# Patient Record
Sex: MALE | Race: WHITE | URBAN - METROPOLITAN AREA
[De-identification: names, ages, dates, MRNs, and addresses within clinical notes are randomized per-mention and may not be internally consistent; named-entity substitution may affect disease eponyms.]

---

## 2023-05-11 ENCOUNTER — TELEPHONE (OUTPATIENT)
Dept: CARDIOLOGY CLINIC | Age: 73
End: 2023-05-11

## 2023-06-12 PROBLEM — R42 DIZZINESS: Status: ACTIVE | Noted: 2023-06-12

## 2023-06-12 PROBLEM — R06.02 SOB (SHORTNESS OF BREATH): Status: ACTIVE | Noted: 2023-06-12

## 2023-06-12 PROBLEM — R53.83 FATIGUE: Status: ACTIVE | Noted: 2023-06-12

## 2023-06-26 ENCOUNTER — TELEPHONE (OUTPATIENT)
Dept: INTERVENTIONAL RADIOLOGY/VASCULAR | Age: 73
End: 2023-06-26

## 2023-06-30 ENCOUNTER — PROCEDURE VISIT (OUTPATIENT)
Dept: CARDIOLOGY CLINIC | Age: 73
End: 2023-06-30

## 2023-06-30 DIAGNOSIS — R42 LIGHTHEADEDNESS: ICD-10-CM

## 2023-06-30 DIAGNOSIS — R42 DIZZINESS: ICD-10-CM

## 2023-07-03 ENCOUNTER — HOSPITAL ENCOUNTER (OUTPATIENT)
Dept: CT IMAGING | Age: 73
Discharge: HOME | End: 2023-07-03
Payer: MEDICARE

## 2023-07-03 ENCOUNTER — TELEPHONE (OUTPATIENT)
Dept: CARDIOLOGY CLINIC | Age: 73
End: 2023-07-03

## 2023-07-03 VITALS
SYSTOLIC BLOOD PRESSURE: 125 MMHG | BODY MASS INDEX: 20.44 KG/M2 | TEMPERATURE: 98 F | RESPIRATION RATE: 18 BRPM | HEIGHT: 69 IN | DIASTOLIC BLOOD PRESSURE: 62 MMHG | HEART RATE: 55 BPM | OXYGEN SATURATION: 99 % | WEIGHT: 138 LBS

## 2023-07-03 DIAGNOSIS — R06.09 DOE (DYSPNEA ON EXERTION): ICD-10-CM

## 2023-07-03 DIAGNOSIS — I73.9 CLAUDICATION (HCC): ICD-10-CM

## 2023-07-03 DIAGNOSIS — I70.212 ATHEROSCLEROSIS OF NATIVE ARTERIES OF EXTREMITIES WITH INTERMITTENT CLAUDICATION, LEFT LEG (HCC): ICD-10-CM

## 2023-07-03 DIAGNOSIS — R53.83 OTHER FATIGUE: ICD-10-CM

## 2023-07-03 DIAGNOSIS — R93.1 ABNORMAL COMPUTED TOMOGRAPHY ANGIOGRAPHY OF HEART: ICD-10-CM

## 2023-07-03 DIAGNOSIS — R93.5 ABNORMAL COMPUTED TOMOGRAPHY ANGIOGRAPHY (CTA) OF ABDOMEN: Primary | ICD-10-CM

## 2023-07-03 DIAGNOSIS — R06.02 SOB (SHORTNESS OF BREATH): ICD-10-CM

## 2023-07-03 DIAGNOSIS — F17.200 SMOKER: ICD-10-CM

## 2023-07-03 DIAGNOSIS — R94.30 ABNORMAL RESULT OF CARDIOVASCULAR FUNCTION STUDY, UNSPECIFIED: ICD-10-CM

## 2023-07-03 DIAGNOSIS — R42 DIZZINESS: ICD-10-CM

## 2023-07-03 LAB
CREAT SERPL-MCNC: 0.6 MG/DL (ref 0.8–1.3)
GFR SERPLBLD CREATININE-BSD FMLA CKD-EPI: >60 ML/MIN/{1.73_M2}

## 2023-07-03 PROCEDURE — 6360000004 HC RX CONTRAST MEDICATION: Performed by: INTERNAL MEDICINE

## 2023-07-03 PROCEDURE — 75635 CT ANGIO ABDOMINAL ARTERIES: CPT

## 2023-07-03 PROCEDURE — 82565 ASSAY OF CREATININE: CPT

## 2023-07-03 PROCEDURE — 36415 COLL VENOUS BLD VENIPUNCTURE: CPT

## 2023-07-03 PROCEDURE — 75574 CT ANGIO HRT W/3D IMAGE: CPT

## 2023-07-03 PROCEDURE — 6370000000 HC RX 637 (ALT 250 FOR IP): Performed by: STUDENT IN AN ORGANIZED HEALTH CARE EDUCATION/TRAINING PROGRAM

## 2023-07-03 RX ORDER — NITROGLYCERIN 0.4 MG/1
0.4 TABLET SUBLINGUAL ONCE
Status: COMPLETED | OUTPATIENT
Start: 2023-07-03 | End: 2023-07-03

## 2023-07-03 RX ADMIN — IOHEXOL 85 ML: 350 INJECTION, SOLUTION INTRAVENOUS at 08:46

## 2023-07-03 RX ADMIN — IOHEXOL 120 ML: 350 INJECTION, SOLUTION INTRAVENOUS at 08:58

## 2023-07-03 RX ADMIN — NITROGLYCERIN 0.4 MG: 0.4 TABLET, ORALLY DISINTEGRATING SUBLINGUAL at 08:54

## 2023-07-03 ASSESSMENT — PAIN - FUNCTIONAL ASSESSMENT: PAIN_FUNCTIONAL_ASSESSMENT: 0-10

## 2023-07-03 NOTE — PROGRESS NOTES
Cor cta complete, pt tolerated well  Pt given verbal and written discharge instructions, pt verbalizes understanding

## 2023-07-05 ENCOUNTER — TELEPHONE (OUTPATIENT)
Dept: CARDIOLOGY CLINIC | Age: 73
End: 2023-07-05

## 2023-07-05 PROBLEM — R94.30 ABNORMAL RESULT OF CARDIOVASCULAR FUNCTION STUDY, UNSPECIFIED: Status: ACTIVE | Noted: 2023-07-05

## 2023-07-05 PROBLEM — R06.09 DOE (DYSPNEA ON EXERTION): Status: ACTIVE | Noted: 2023-06-12

## 2023-07-05 PROBLEM — I73.9 CLAUDICATION (HCC): Status: ACTIVE | Noted: 2023-07-05

## 2023-07-05 PROBLEM — I70.212 ATHEROSCLEROSIS OF NATIVE ARTERIES OF EXTREMITIES WITH INTERMITTENT CLAUDICATION, LEFT LEG (HCC): Status: ACTIVE | Noted: 2023-07-05

## 2023-07-05 PROBLEM — F17.200 SMOKER: Status: ACTIVE | Noted: 2023-07-05

## 2023-07-05 NOTE — TELEPHONE ENCOUNTER
Patient called back again trying to reach Angela/. Andrew Canas spoke to patient and offered him an appt with [address] per his request and patient now declined. He wants to go ahead and schedule LHC. Attempted to direct transfer to [facility], unable to reach. M for her to return his call. Pt advised to keep phone by him so he does not miss the call again. Pt verbalized understanding and was agreeable to plan.

## 2023-07-05 NOTE — TELEPHONE ENCOUNTER
----- Message from Flory Hooker MD sent at 7/2/2023 10:23 PM EDT -----  Just mild athero  Continue aspirin and statin if no contraindication  He'll need a repeat scan in 1 year

## 2023-07-05 NOTE — TELEPHONE ENCOUNTER
----- Message from Papi Tompkins MD sent at 7/2/2023 10:23 PM EDT -----  Just mild athero  Continue aspirin and statin if no contraindication  He'll need a repeat scan in 1 year

## 2023-07-05 NOTE — TELEPHONE ENCOUNTER
Spoke with patient and got him scheduled  for procedure. We went over basic instructions and he verbalized understanding.        Procedure - [facility]   Date: 7/12/2023  Arrival time: 12:00pm   Procedure time: 1:00 pm       Polly updated / Added in [id number] [facility] 15 / emailed cath lab

## 2023-07-05 NOTE — TELEPHONE ENCOUNTER
Patient called back and I attempted to discuss results of Carotids. Pt adamant about discussing the [facility] with WALLACE. Advised patient that [address] is out until the week of the 17th. Pt already has an appt that day so he can ask questions then. Pt then stated that he would like to speak to [facility]. He \"will not allow anyone to do anything until his questions are answered\". Pt agreed to return Angela's call to get it scheduled in the meantime. He will keep his appt with WALLACE on the 17th. He would like to speak or see in person [facility] before he will proceed. He does not want to speak to a nurse, only the doctor performing the procedure.

## 2023-07-12 ENCOUNTER — HOSPITAL ENCOUNTER (OUTPATIENT)
Dept: CARDIAC CATH/INVASIVE PROCEDURES | Age: 73
Discharge: HOME | End: 2023-07-12
Attending: INTERNAL MEDICINE | Admitting: INTERNAL MEDICINE
Payer: MEDICARE

## 2023-07-12 ENCOUNTER — TELEPHONE (OUTPATIENT)
Dept: CARDIOLOGY CLINIC | Age: 73
End: 2023-07-12

## 2023-07-12 VITALS
HEART RATE: 74 BPM | HEIGHT: 69 IN | BODY MASS INDEX: 19.99 KG/M2 | WEIGHT: 135 LBS | SYSTOLIC BLOOD PRESSURE: 149 MMHG | DIASTOLIC BLOOD PRESSURE: 71 MMHG

## 2023-07-12 DIAGNOSIS — R93.1 ABNORMAL COMPUTED TOMOGRAPHY ANGIOGRAPHY OF HEART: ICD-10-CM

## 2023-07-12 LAB
ANION GAP SERPL CALCULATED.3IONS-SCNC: 12 MMOL/L (ref 3–16)
BUN SERPL-MCNC: 9 MG/DL (ref 7–20)
CALCIUM SERPL-MCNC: 9.5 MG/DL (ref 8.3–10.6)
CHLORIDE SERPL-SCNC: 103 MMOL/L (ref 99–110)
CO2 SERPL-SCNC: 23 MMOL/L (ref 21–32)
CREAT SERPL-MCNC: <0.5 MG/DL (ref 0.8–1.3)
DEPRECATED RDW RBC AUTO: 19.6 % (ref 12.4–15.4)
EKG ATRIAL RATE: 74 BPM
EKG DIAGNOSIS: NORMAL
EKG P AXIS: 62 DEGREES
EKG P-R INTERVAL: 150 MS
EKG Q-T INTERVAL: 406 MS
EKG QRS DURATION: 102 MS
EKG QTC CALCULATION (BAZETT): 450 MS
EKG R AXIS: -10 DEGREES
EKG T AXIS: 3 DEGREES
EKG VENTRICULAR RATE: 74 BPM
GFR SERPLBLD CREATININE-BSD FMLA CKD-EPI: >60 ML/MIN/{1.73_M2}
GLUCOSE SERPL-MCNC: 109 MG/DL (ref 70–99)
HCT VFR BLD AUTO: 39.1 % (ref 40.5–52.5)
HGB BLD-MCNC: 13 G/DL (ref 13.5–17.5)
LEFT VENTRICULAR EJECTION FRACTION MODE: NORMAL
LV EF: 50 %
LV EF: 50 %
LVEF MODALITY: NORMAL
MCH RBC QN AUTO: 29.6 PG (ref 26–34)
MCHC RBC AUTO-ENTMCNC: 33.3 G/DL (ref 31–36)
MCV RBC AUTO: 89.1 FL (ref 80–100)
PLATELET # BLD AUTO: 233 K/UL (ref 135–450)
PMV BLD AUTO: 8.1 FL (ref 5–10.5)
POTASSIUM SERPL-SCNC: 4 MMOL/L (ref 3.5–5.1)
RBC # BLD AUTO: 4.39 M/UL (ref 4.2–5.9)
SODIUM SERPL-SCNC: 138 MMOL/L (ref 136–145)
WBC # BLD AUTO: 3.6 K/UL (ref 4–11)

## 2023-07-12 PROCEDURE — C1769 GUIDE WIRE: HCPCS

## 2023-07-12 PROCEDURE — 2709999900 HC NON-CHARGEABLE SUPPLY

## 2023-07-12 PROCEDURE — 6360000002 HC RX W HCPCS

## 2023-07-12 PROCEDURE — C1894 INTRO/SHEATH, NON-LASER: HCPCS

## 2023-07-12 PROCEDURE — 36415 COLL VENOUS BLD VENIPUNCTURE: CPT

## 2023-07-12 PROCEDURE — 6360000004 HC RX CONTRAST MEDICATION: Performed by: INTERNAL MEDICINE

## 2023-07-12 PROCEDURE — 93458 L HRT ARTERY/VENTRICLE ANGIO: CPT

## 2023-07-12 PROCEDURE — 99153 MOD SED SAME PHYS/QHP EA: CPT

## 2023-07-12 PROCEDURE — 93005 ELECTROCARDIOGRAM TRACING: CPT | Performed by: INTERNAL MEDICINE

## 2023-07-12 PROCEDURE — 99152 MOD SED SAME PHYS/QHP 5/>YRS: CPT

## 2023-07-12 PROCEDURE — 85027 COMPLETE CBC AUTOMATED: CPT

## 2023-07-12 PROCEDURE — 93010 ELECTROCARDIOGRAM REPORT: CPT | Performed by: INTERNAL MEDICINE

## 2023-07-12 PROCEDURE — 2500000003 HC RX 250 WO HCPCS

## 2023-07-12 PROCEDURE — 80048 BASIC METABOLIC PNL TOTAL CA: CPT

## 2023-07-12 RX ORDER — SODIUM CHLORIDE 0.9 % (FLUSH) 0.9 %
5-40 SYRINGE (ML) INJECTION PRN
Status: DISCONTINUED | OUTPATIENT
Start: 2023-07-12 | End: 2023-07-13 | Stop reason: HOSPADM

## 2023-07-12 RX ADMIN — IOPAMIDOL 55 ML: 755 INJECTION, SOLUTION INTRAVENOUS at 14:36

## 2023-07-12 NOTE — PROCEDURES
Patient:  Kim Viera   :   1950    Procedural Summary  ~Consent:   Obtained written and verbal consent      Risks/benefits explained in detail  ~Procedure:    Left Heart Catheterization  ~Medications:    Procedural sedation with minimal conscious sedation  ~Complications:   None  ~Blood Loss:    <10cc  ~Specimens:    None obtained  ~Pre-sedation re-evaluation: Performed immediately prior to procedure. Medication and Procedural Reconciliation:  An independent trained observer pushed medications at my direction. We monitored the patient's level of consciousness and vital signs/physiologic status throughout the procedure duration (see start and stop times below). Sedation: 4 mg Versed, 150 mcg Fentanyl  Sedation start: 1245  Sedation stop: 1314    Cardiac Cath LVG:  Anatomy:   LM-nml   LAD-prox 50%, mid 60-70% heavily calcified  Cx-nml  OM- mid 99%  RCA-dom mid 100%   RPDA- L to R collaterals to LAD  LVEF- 50%  LVG- inferobasal akinesis  LVEDP- 2    Contrast: 55  Flouro Time: 2.2  Access: R radial a    Impression  ~Coronary Angiography w/ severe MVD  ~LVG with LVEF of 50% and inferior regional wall motion abnormalities. Recommendation  ~Aggressive medical treatment and risk factor modification  ~1. Medications reviewed, no changes at this time. 2. Post cath IVF. Bedrest.  3. Patient with moderate to severe multi vessel disease. 4. Patient has been advised on the importance of regular exercise of at least 20-30 minutes daily alternating with aerobic and isometric activities. 5. Patient counseled about and offered assistance for smoking cessation   6. No indication for cardiac rehab  7. Follow up with Dr. Mónica Aguirre as outpatient to discuss need for CABG. He is asymptomatic and active. LAD disease is not critical. I believe that he would gain little benefit from revascularization at this time. If referring doc recommends CABG, patient will follow up with Dr. Glenis Gomes later next week.          Karime Morgan
PEDS

## 2023-07-12 NOTE — H&P
[address]  [phone]  6/14/23  Referring: Dr. Gee Beltran CONSULT/CHIEF COMPLAINT/HPI     Reason for visit/ Chief complaint  Dyspnea on Exertion  Dizziness Claudication   HPI Yandy Price is a 75 y.o. male patient who was seen for the first time by me 6/12/23 after a referral from Dr Yvette Lackey for Shortness of Breath, Fatigue, Dizzienss, and Lightheadedness. At that time, he c/o Dizziness, Shortness of Breath and Fatigue. He had COVID at the end of last year. Denies CP or tightness. He smoked since he was 13 yo, 3/4 ppd and is not interested in quitting. He has a hx of drinking a lot of alcohol but does not really drink anymore. He is a retired . The dizziness is positional. He thinks it is a lack of food intake; he has not had an appetite since COVID. Today, Virlinda Halsted was walking the treadmill during a stress test when he suddenly jumped off after 3 minutes due to severe cramping in both calves. He said his legs hurt when he walks a lot or vigorously. At home, his legs feels better after a few minutes of rest, but he has to stop due to pain on a very frequent basis. He has not noticed any color changes or cold feelings in his leg. He has occasionally had decreased sensation on the base of his left foot. He still has intermittent symptoms of chest discomfort and SOB. He is still smoking. He notes today that he has lost a fair amount of weight unintentionally over the last few months. Patient is adherent with medications and is tolerating them well without side effects     HISTORY/ALLERGIES/ROS     MedHx: Tobacco abuse  SurgHx:  has no past surgical history on file. SocHx:  reports that he has been smoking cigarettes. He has never used smokeless tobacco. He reports current alcohol use. He reports current drug use. Drug: Marijuana Richard Sida).    FamHx:   Family History   Problem Relation Age of Onset    Cancer Father      Allergies: Patient

## 2023-07-17 ENCOUNTER — OFFICE VISIT (OUTPATIENT)
Dept: CARDIOLOGY CLINIC | Age: 73
End: 2023-07-17
Payer: MEDICARE

## 2023-07-17 VITALS
WEIGHT: 136 LBS | HEIGHT: 69 IN | SYSTOLIC BLOOD PRESSURE: 126 MMHG | BODY MASS INDEX: 20.14 KG/M2 | DIASTOLIC BLOOD PRESSURE: 64 MMHG | OXYGEN SATURATION: 97 % | HEART RATE: 75 BPM

## 2023-07-17 DIAGNOSIS — R06.02 SOB (SHORTNESS OF BREATH): ICD-10-CM

## 2023-07-17 DIAGNOSIS — F17.200 SMOKER: ICD-10-CM

## 2023-07-17 DIAGNOSIS — R53.83 OTHER FATIGUE: ICD-10-CM

## 2023-07-17 DIAGNOSIS — R42 DIZZINESS: ICD-10-CM

## 2023-07-17 DIAGNOSIS — R06.09 DOE (DYSPNEA ON EXERTION): ICD-10-CM

## 2023-07-17 DIAGNOSIS — I73.9 CLAUDICATION (HCC): Primary | ICD-10-CM

## 2023-07-17 DIAGNOSIS — I20.8 ANGINAL EQUIVALENT (HCC): ICD-10-CM

## 2023-07-17 DIAGNOSIS — I25.10 CAD IN NATIVE ARTERY: ICD-10-CM

## 2023-07-17 DIAGNOSIS — I70.212 ATHEROSCLEROSIS OF NATIVE ARTERIES OF EXTREMITIES WITH INTERMITTENT CLAUDICATION, LEFT LEG (HCC): ICD-10-CM

## 2023-07-17 PROCEDURE — G8420 CALC BMI NORM PARAMETERS: HCPCS | Performed by: INTERNAL MEDICINE

## 2023-07-17 PROCEDURE — 3017F COLORECTAL CA SCREEN DOC REV: CPT | Performed by: INTERNAL MEDICINE

## 2023-07-17 PROCEDURE — 1123F ACP DISCUSS/DSCN MKR DOCD: CPT | Performed by: INTERNAL MEDICINE

## 2023-07-17 PROCEDURE — 99215 OFFICE O/P EST HI 40 MIN: CPT | Performed by: INTERNAL MEDICINE

## 2023-07-17 PROCEDURE — 4004F PT TOBACCO SCREEN RCVD TLK: CPT | Performed by: INTERNAL MEDICINE

## 2023-07-17 PROCEDURE — G8427 DOCREV CUR MEDS BY ELIG CLIN: HCPCS | Performed by: INTERNAL MEDICINE

## 2023-07-17 RX ORDER — MIRTAZAPINE 7.5 MG/1
7.5 TABLET, FILM COATED ORAL NIGHTLY
COMMUNITY
Start: 2023-07-07

## 2023-07-17 RX ORDER — METOPROLOL SUCCINATE 25 MG/1
25 TABLET, EXTENDED RELEASE ORAL DAILY
Qty: 90 TABLET | Refills: 3 | Status: SHIPPED | OUTPATIENT
Start: 2023-07-17

## 2023-07-17 NOTE — PATIENT INSTRUCTIONS
Follow up with Dr Yudi Adan in 1 month     Start Metoprolol XL 25mg daily. Okay to continue Mirtazapine, there is no cardiac contraindication. Light lifting no more than 10 lbs for a couple weeks with the R arm      Continue with your appointment with Dr Pete Sheikh off on eye surgery until you see Dr Sandra Finch. It is very important that you quit smoking completely     Call for any questions or concerns.

## 2023-07-18 ENCOUNTER — TELEPHONE (OUTPATIENT)
Dept: CARDIOLOGY CLINIC | Age: 73
End: 2023-07-18

## 2023-07-18 NOTE — TELEPHONE ENCOUNTER
Attempted to call patient per [facility] message. NA/LVM to return our call.

## 2023-07-18 NOTE — TELEPHONE ENCOUNTER
Pt returned call. Discussed his question about metoprolol. Advised per WALLACE. Pt verbalized understanding and has no further questions at this time.

## 2023-07-20 ENCOUNTER — OFFICE VISIT (OUTPATIENT)
Dept: CARDIOTHORACIC SURGERY | Age: 73
End: 2023-07-20
Payer: MEDICARE

## 2023-07-20 ENCOUNTER — TELEPHONE (OUTPATIENT)
Dept: CARDIOLOGY CLINIC | Age: 73
End: 2023-07-20

## 2023-07-20 VITALS
WEIGHT: 138 LBS | DIASTOLIC BLOOD PRESSURE: 72 MMHG | OXYGEN SATURATION: 97 % | BODY MASS INDEX: 20.44 KG/M2 | HEART RATE: 73 BPM | HEIGHT: 69 IN | TEMPERATURE: 98.4 F | SYSTOLIC BLOOD PRESSURE: 138 MMHG

## 2023-07-20 DIAGNOSIS — R06.09 DOE (DYSPNEA ON EXERTION): ICD-10-CM

## 2023-07-20 DIAGNOSIS — R53.83 FATIGUE, UNSPECIFIED TYPE: ICD-10-CM

## 2023-07-20 DIAGNOSIS — I10 ESSENTIAL HYPERTENSION: ICD-10-CM

## 2023-07-20 DIAGNOSIS — I25.10 CORONARY ARTERY DISEASE INVOLVING NATIVE CORONARY ARTERY OF NATIVE HEART WITHOUT ANGINA PECTORIS: Primary | ICD-10-CM

## 2023-07-20 DIAGNOSIS — F17.200 SMOKER: ICD-10-CM

## 2023-07-20 PROCEDURE — 3075F SYST BP GE 130 - 139MM HG: CPT | Performed by: THORACIC SURGERY (CARDIOTHORACIC VASCULAR SURGERY)

## 2023-07-20 PROCEDURE — G8420 CALC BMI NORM PARAMETERS: HCPCS | Performed by: THORACIC SURGERY (CARDIOTHORACIC VASCULAR SURGERY)

## 2023-07-20 PROCEDURE — 4004F PT TOBACCO SCREEN RCVD TLK: CPT | Performed by: THORACIC SURGERY (CARDIOTHORACIC VASCULAR SURGERY)

## 2023-07-20 PROCEDURE — G8427 DOCREV CUR MEDS BY ELIG CLIN: HCPCS | Performed by: THORACIC SURGERY (CARDIOTHORACIC VASCULAR SURGERY)

## 2023-07-20 PROCEDURE — 3017F COLORECTAL CA SCREEN DOC REV: CPT | Performed by: THORACIC SURGERY (CARDIOTHORACIC VASCULAR SURGERY)

## 2023-07-20 PROCEDURE — 3078F DIAST BP <80 MM HG: CPT | Performed by: THORACIC SURGERY (CARDIOTHORACIC VASCULAR SURGERY)

## 2023-07-20 PROCEDURE — 1123F ACP DISCUSS/DSCN MKR DOCD: CPT | Performed by: THORACIC SURGERY (CARDIOTHORACIC VASCULAR SURGERY)

## 2023-07-20 PROCEDURE — 99205 OFFICE O/P NEW HI 60 MIN: CPT | Performed by: THORACIC SURGERY (CARDIOTHORACIC VASCULAR SURGERY)

## 2023-07-20 NOTE — PROGRESS NOTES
Review of Systems:  Constitutional:  No night sweats, headaches. + weight loss r/t COVID (improving). Eyes:  + glaucoma, cataracts. ENMT:  No nosebleeds, deviated septum. Cardiac:  No arrhythmias. Vascular:  No claudication, varicosities. GI:  No PUD, heartburn. :  No kidney stones, frequent UTIs  Musculoskeletal:  + arthritis L shoulder. No gout. Respiratory:  No SOB, emphysema, asthma. Integumentary:  No dermatitis, itching, rash. Neurological:  No stroke, TIAs, seizures. + dizziness  Psychiatric:  No depression, anxiety. Endocrine: No diabetes, thyroid issues. Hematologic:  No bleeding, easy bruising. Immunologic:  No known cancer, steroid therapies.
has been recommended and discussed with the patient. I have discussed the risks, benefits and alternatives with patient, including risks of infection, bleeding, MI, stroke, arrhythmias and an operative mortality risk of 1-2% as outlined in the STS Cardiac Surgery Risk profile above. I also discussed the the alternative of not doing surgery and the consequences of that action. Questions have been answered and the patient is willing to proceed. I also had a discussion regarding secondary risk modification with him, addressing the need for immediate tobacco cessation, weight loss through dietary modification and exercise, and will continue to have those conversations with him post op. Surgery to be scheduled for a future date pending his cataract surgery. He wants to get his cataract surgery done before his CABG surgery which is reasonable so that his Plavix medication does not need to be interrupted. Carotid duplex scan. Vein mapping. PFTs.     Gerri Simons MD, MD  FACS,  [facility]  7/20/2023  1:05 PM

## 2023-07-21 ENCOUNTER — HOSPITAL ENCOUNTER (OUTPATIENT)
Dept: CT IMAGING | Age: 73
Discharge: HOME | End: 2023-07-21
Attending: INTERNAL MEDICINE
Payer: MEDICARE

## 2023-07-21 DIAGNOSIS — R93.5 ABNORMAL COMPUTED TOMOGRAPHY ANGIOGRAPHY (CTA) OF ABDOMEN: ICD-10-CM

## 2023-07-21 PROCEDURE — 6360000004 HC RX CONTRAST MEDICATION: Performed by: INTERNAL MEDICINE

## 2023-07-21 PROCEDURE — 74170 CT ABD WO CNTRST FLWD CNTRST: CPT

## 2023-07-21 RX ADMIN — IOPAMIDOL 75 ML: 755 INJECTION, SOLUTION INTRAVENOUS at 15:20

## 2023-07-21 NOTE — TELEPHONE ENCOUNTER
Returned patients call. He could not remember his questions but it was in regards to his upcoming CABG with Dr César Kelly. I advised patient to call that office when he remembers the question to get questions answered related to that surgery. Pt verbalized understanding and was agreeable to plan.

## 2023-07-23 NOTE — PROGRESS NOTES
[facility]  Consultation Note    Chief Complaint / Reason for Consultation  Claudication    History of Present Illness  Patient is a 75 y.o. male referred today by Dr. Amol Ricks for lower extremity claudication. His associate medical history is significant for coronary artery disease, tobacco abuse and hypertension. He has recently been seen by Dr. Cesario Howe with tentative plans for CABG based on recent coronary angiogram.  While patient has mild cardiac symptoms biggest complaint patient has is lower extremity claudication. He describes pain and discomfort in both calves equally with approximately 100 to 200 yards. He states his walking distance has improved now that he is walking more. He states for approximately a year and a half he was in severe depression and really did not get off the couch or do any activity. Over the last 4 months has been increasing his activity and working on his smoking cessation. He is down to 4 to 5 cigarettes/day. No pain at rest.    Review of Systems     Denies fevers, chills, chest pain, shortness of breath, nausea, vomiting, hematemesis, diarrhea, constipation, melena, hematochezia, wt changes, vision problems, blindness, hearing problems, facial droop, slurred speech, extremity weakness, extremity numbness, dysuria. Past Medical History:   has a past medical history of CAD (coronary artery disease), Glaucoma, and Hypertension. Past Surgical History:   has a past surgical history that includes Diagnostic Cardiac Cath Lab Procedure and Arm Surgery. Medications:  Current Outpatient Medications on File Prior to Visit   Medication Sig Dispense Refill    diclofenac sodium (VOLTAREN) 1 % GEL PLEASE SEE ATTACHED FOR DETAILED DIRECTIONS (Patient not taking: Reported on 7/20/2023)      mirtazapine (REMERON) 7.5 MG tablet Take 1 tablet by mouth nightly at bedtime.       metoprolol succinate (TOPROL XL) 25 MG extended release tablet Take 1 tablet by mouth

## 2023-07-24 ENCOUNTER — TELEPHONE (OUTPATIENT)
Dept: CARDIOLOGY CLINIC | Age: 73
End: 2023-07-24

## 2023-07-24 NOTE — TELEPHONE ENCOUNTER
----- Message from Trina Campos MD sent at 7/24/2023 12:23 PM EDT -----  Liver mass appears to just be  a benign cyst

## 2023-07-25 ENCOUNTER — OFFICE VISIT (OUTPATIENT)
Dept: VASCULAR SURGERY | Age: 73
End: 2023-07-25
Payer: MEDICARE

## 2023-07-25 VITALS
WEIGHT: 136 LBS | DIASTOLIC BLOOD PRESSURE: 60 MMHG | HEIGHT: 69 IN | BODY MASS INDEX: 20.14 KG/M2 | SYSTOLIC BLOOD PRESSURE: 124 MMHG

## 2023-07-25 DIAGNOSIS — I70.213 ATHEROSCLEROSIS OF NATIVE ARTERIES OF EXTREMITIES WITH INTERMITTENT CLAUDICATION, BILATERAL LEGS (HCC): Primary | ICD-10-CM

## 2023-07-25 PROCEDURE — G8420 CALC BMI NORM PARAMETERS: HCPCS | Performed by: SURGERY

## 2023-07-25 PROCEDURE — G8427 DOCREV CUR MEDS BY ELIG CLIN: HCPCS | Performed by: SURGERY

## 2023-07-25 PROCEDURE — 3017F COLORECTAL CA SCREEN DOC REV: CPT | Performed by: SURGERY

## 2023-07-25 PROCEDURE — 99203 OFFICE O/P NEW LOW 30 MIN: CPT | Performed by: SURGERY

## 2023-07-25 PROCEDURE — 4004F PT TOBACCO SCREEN RCVD TLK: CPT | Performed by: SURGERY

## 2023-07-25 PROCEDURE — 1123F ACP DISCUSS/DSCN MKR DOCD: CPT | Performed by: SURGERY

## 2023-07-28 ENCOUNTER — TELEPHONE (OUTPATIENT)
Dept: CARDIOLOGY CLINIC | Age: 73
End: 2023-07-28

## 2023-08-02 ENCOUNTER — TELEPHONE (OUTPATIENT)
Dept: CARDIOLOGY CLINIC | Age: 73
End: 2023-08-02

## 2023-08-03 ENCOUNTER — TELEPHONE (OUTPATIENT)
Dept: CARDIOLOGY CLINIC | Age: 73
End: 2023-08-03

## 2023-08-03 NOTE — TELEPHONE ENCOUNTER
CARDIAC CLEARANCE     What type of procedure are you having? Cataract sx on right eye    Which physician is performing your procedure? Dr. Shane Laughlin    When is your procedure scheduled for?  08/08/23    Where are you having this procedure? [facility]    Are you taking Blood Thinners? no   If so what? (Name/dose/frequesncy)     Does the surgeon want you to stop your blood thinner? no  If so for how long?     Phone Number and Contact Name for Physicians office:  [phone]    Fax number to send information:  [phone]

## 2023-08-04 ENCOUNTER — TELEPHONE (OUTPATIENT)
Dept: CARDIOLOGY CLINIC | Age: 73
End: 2023-08-04

## 2023-08-04 NOTE — TELEPHONE ENCOUNTER
[address] is out of the office today but message given to  that [address] is out until Monday and we will send his clearance on Monday when he returns. Called patient and advised of the above. He is doing well, no CP or SOB at this time. Cut his grass yesterday and did fine. Walking and doing chores as much as he can to stay active. He is slowly cutting back on smoking.

## 2023-08-04 NOTE — TELEPHONE ENCOUNTER
Pt called to check on his clearance   Per Pt he is requesting for the clearance to be sent today so he can get his procedure on Tues. 08/08/2023. Please advise.   Thank you

## 2023-08-07 ENCOUNTER — TELEPHONE (OUTPATIENT)
Dept: CARDIOLOGY CLINIC | Age: 73
End: 2023-08-07

## 2023-08-11 NOTE — PROGRESS NOTES
[address]  [phone]  8/11/23  Referring: Dr. Carol Palumbo CONSULT/CHIEF COMPLAINT/HPI     Reason for visit/ Chief complaint  Dyspnea on Exertion  Dizziness Claudication   HPI Ozzy Jacobson is a 75 y.o. male patient I just met last month in consultation for a variety of symptoms including shortness of breath and dizziness. He smoked since he was 11 yo, 3/4 ppd and is not interested in quitting. He has a hx of drinking a lot of alcohol but does not really drink anymore. Given that he wasn't able to complete his treadmill, given his profoundly high pretest probability of CAD I got him a coronary CTA, which was very abnormal, thus he was referred for cath. This was one last week showing a  of his mid (dominant) RCA with collaterals from LAD to PDA, a normal LM and LCx, moderate 60-70% heavily calcified LAD lesion, and a 99% mid OM1. No intervention was done at the time, given concerns that he might need a CABG. Patient reported to interventional cardiology that he had \"no symptoms,\" thus an outpt rather than inpatient referral was made to CT surgery. At a prior visit, I started him on aspirin and plavix, and encouraged him to quit smoking. I am unsure why he stopped taking the aspirin. I got a CT of his abdominal aorta with bilateral runoff, which was also abnormal, and he has an appt to see PVS next week. OV 7/17/23:  He has cut back on smoking and is currently about 1 pack per week. His thinks his SOB is getting better since quitting smoking. He denies any CP, palpitations  at this time. He does have dizziness at times but thinks it is due to his eye. Today, he    Patient is adherent with medications and is tolerating them well without side effects     HISTORY/ALLERGIES/ROS     MedHx: Tobacco abuse, CAD, PVC  SurgHx:  has a past surgical history that includes Diagnostic Cardiac Cath Lab Procedure and Arm Surgery.    SocHx:  reports that he

## 2023-08-15 PROBLEM — I20.89 ANGINAL EQUIVALENT: Status: ACTIVE | Noted: 2023-08-15

## 2023-08-15 PROBLEM — I20.8 ANGINAL EQUIVALENT (HCC): Status: ACTIVE | Noted: 2023-08-15

## 2023-08-15 PROBLEM — I25.10 CAD IN NATIVE ARTERY: Status: ACTIVE | Noted: 2023-08-15

## 2023-08-15 NOTE — PROGRESS NOTES
[address]  [phone]  8/16/23  Referring: Dr. Wynn Never CONSULT/CHIEF COMPLAINT/HPI     Reason for visit/ Chief complaint  Dyspnea on Exertion  Dizziness Claudication  CAD   HPI Mike David is a 75 y.o. male patient I just met last month in consultation for a variety of symptoms including shortness of breath and dizziness. Cath done about a month ago showed 3-v disease so he was referred for CABG. This is scheduled 9/20 with Dr. Robert Fernandez. He had his R eye surgery completed. He still needs the L eye done. When he cuts the grass his legs hurt so he cuts for a few minutes and then relax for about 10 min and it goes away. No complaints of CP, SOB, palpitations or dizziness at this time. He is feeling better since starting the metoprolol. Patient is adherent with medications and is tolerating them well without side effects     HISTORY/ALLERGIES/ROS     MedHx: Tobacco abuse, CAD, PVC  SurgHx:  has a past surgical history that includes Diagnostic Cardiac Cath Lab Procedure; Arm Surgery; and Cataract extraction. SocHx:  reports that he has been smoking cigarettes. He has never used smokeless tobacco. He reports current alcohol use. He reports current drug use. Drug: Marijuana Navjot Noemi). FamHx:   Family History   Problem Relation Age of Onset    Cancer Father      Allergies: Patient has no known allergies. MEDICATIONS      Prior to Admission medications    Medication Sig Start Date End Date Taking? Authorizing Provider   diclofenac sodium (VOLTAREN) 1 % GEL  7/7/23  Yes Historical Provider, MD   mirtazapine (REMERON) 7.5 MG tablet Take 1 tablet by mouth nightly at bedtime.  7/7/23  Yes Historical Provider, MD   metoprolol succinate (TOPROL XL) 25 MG extended release tablet Take 1 tablet by mouth daily 7/17/23  Yes Jayson Saldaña MD   clopidogrel (PLAVIX) 75 MG tablet Take 1 tablet by mouth daily 6/14/23  Yes Jayson Saldaña MD   Cholecalciferol 50 MCG

## 2023-08-16 ENCOUNTER — OFFICE VISIT (OUTPATIENT)
Dept: CARDIOLOGY CLINIC | Age: 73
End: 2023-08-16
Payer: MEDICARE

## 2023-08-16 VITALS
OXYGEN SATURATION: 99 % | BODY MASS INDEX: 20.14 KG/M2 | DIASTOLIC BLOOD PRESSURE: 58 MMHG | HEIGHT: 69 IN | WEIGHT: 136 LBS | SYSTOLIC BLOOD PRESSURE: 108 MMHG | HEART RATE: 73 BPM

## 2023-08-16 DIAGNOSIS — F17.200 SMOKER: ICD-10-CM

## 2023-08-16 DIAGNOSIS — I25.10 CAD IN NATIVE ARTERY: ICD-10-CM

## 2023-08-16 DIAGNOSIS — R42 DIZZINESS: ICD-10-CM

## 2023-08-16 DIAGNOSIS — I70.212 ATHEROSCLEROSIS OF NATIVE ARTERIES OF EXTREMITIES WITH INTERMITTENT CLAUDICATION, LEFT LEG (HCC): ICD-10-CM

## 2023-08-16 DIAGNOSIS — R06.09 DOE (DYSPNEA ON EXERTION): ICD-10-CM

## 2023-08-16 DIAGNOSIS — R53.83 OTHER FATIGUE: ICD-10-CM

## 2023-08-16 DIAGNOSIS — I20.8 ANGINAL EQUIVALENT (HCC): ICD-10-CM

## 2023-08-16 DIAGNOSIS — R06.02 SOB (SHORTNESS OF BREATH): ICD-10-CM

## 2023-08-16 DIAGNOSIS — I73.9 CLAUDICATION (HCC): Primary | ICD-10-CM

## 2023-08-16 PROCEDURE — G8420 CALC BMI NORM PARAMETERS: HCPCS | Performed by: INTERNAL MEDICINE

## 2023-08-16 PROCEDURE — 4004F PT TOBACCO SCREEN RCVD TLK: CPT | Performed by: INTERNAL MEDICINE

## 2023-08-16 PROCEDURE — 3017F COLORECTAL CA SCREEN DOC REV: CPT | Performed by: INTERNAL MEDICINE

## 2023-08-16 PROCEDURE — G8427 DOCREV CUR MEDS BY ELIG CLIN: HCPCS | Performed by: INTERNAL MEDICINE

## 2023-08-16 PROCEDURE — 99214 OFFICE O/P EST MOD 30 MIN: CPT | Performed by: INTERNAL MEDICINE

## 2023-08-16 PROCEDURE — 1123F ACP DISCUSS/DSCN MKR DOCD: CPT | Performed by: INTERNAL MEDICINE

## 2023-08-16 NOTE — PATIENT INSTRUCTIONS
Follow up with Dr Arvind Quintero in 3 months     Stop your Plavix September 15th prior to your surgery due to bleeding risks. Call for any questions or concerns.

## 2023-08-21 ENCOUNTER — TELEPHONE (OUTPATIENT)
Dept: CARDIOTHORACIC SURGERY | Age: 73
End: 2023-08-21

## 2023-09-08 ENCOUNTER — TELEPHONE (OUTPATIENT)
Age: 73
End: 2023-09-08

## 2023-09-08 NOTE — TELEPHONE ENCOUNTER
At the request of Dr. Noé Raza, pt was contacted to verify his status and his desire to wait until 9/27/23 for surgery or potentially move up sooner with a different surgeon. States he has had both cataracts removed and has recovered well. He feels well and wants to wait to have surgery with Dr. Noé Raza. \"I feel confident with him. I like him and I want to wait\". Notified Dr. Noé Raza and Ilir Cordoba,  of pt decision.

## 2023-09-12 ENCOUNTER — ANESTHESIA EVENT (OUTPATIENT)
Dept: OPERATING ROOM | Age: 73
End: 2023-09-12

## 2023-09-14 ENCOUNTER — HOSPITAL ENCOUNTER (OUTPATIENT)
Dept: GENERAL RADIOLOGY | Age: 73
Discharge: HOME | End: 2023-09-14
Payer: MEDICARE

## 2023-09-14 ENCOUNTER — HOSPITAL ENCOUNTER (OUTPATIENT)
Dept: PREADMISSION TESTING | Age: 73
End: 2023-09-14
Payer: MEDICARE

## 2023-09-14 ENCOUNTER — HOSPITAL ENCOUNTER (OUTPATIENT)
Dept: VASCULAR LAB | Age: 73
End: 2023-09-14
Payer: MEDICARE

## 2023-09-14 ENCOUNTER — HOSPITAL ENCOUNTER (OUTPATIENT)
Dept: VASCULAR LAB | Age: 73
Discharge: HOME | End: 2023-09-14
Payer: MEDICARE

## 2023-09-14 VITALS
HEIGHT: 69 IN | RESPIRATION RATE: 16 BRPM | TEMPERATURE: 97.9 F | SYSTOLIC BLOOD PRESSURE: 126 MMHG | WEIGHT: 140 LBS | BODY MASS INDEX: 20.73 KG/M2 | OXYGEN SATURATION: 100 % | HEART RATE: 60 BPM | DIASTOLIC BLOOD PRESSURE: 63 MMHG

## 2023-09-14 DIAGNOSIS — I25.10 CORONARY ARTERY DISEASE, UNSPECIFIED VESSEL OR LESION TYPE, UNSPECIFIED WHETHER ANGINA PRESENT, UNSPECIFIED WHETHER NATIVE OR TRANSPLANTED HEART: ICD-10-CM

## 2023-09-14 DIAGNOSIS — I25.119 CORONARY ARTERY DISEASE WITH ANGINA PECTORIS, UNSPECIFIED VESSEL OR LESION TYPE, UNSPECIFIED WHETHER NATIVE OR TRANSPLANTED HEART (HCC): ICD-10-CM

## 2023-09-14 LAB
ABO + RH BLD: NORMAL
ALBUMIN SERPL-MCNC: 4.7 G/DL (ref 3.4–5)
ANION GAP SERPL CALCULATED.3IONS-SCNC: 10 MMOL/L (ref 3–16)
APTT BLD: 28.9 SEC (ref 22.7–35.9)
BACTERIA URNS QL MICRO: ABNORMAL /HPF
BASE EXCESS BLDA CALC-SCNC: -1.2 MMOL/L (ref -3–3)
BASOPHILS # BLD: 0 K/UL (ref 0–0.2)
BASOPHILS NFR BLD: 1.1 %
BILIRUB UR QL STRIP.AUTO: ABNORMAL
BLD GP AB SCN SERPL QL: NORMAL
BUN SERPL-MCNC: 8 MG/DL (ref 7–20)
CALCIUM OXALATE CRYSTALS: PRESENT
CALCIUM SERPL-MCNC: 9.1 MG/DL (ref 8.3–10.6)
CHLORIDE SERPL-SCNC: 101 MMOL/L (ref 99–110)
CLARITY UR: CLEAR
CO2 BLDA-SCNC: 55.2 MMOL/L
CO2 SERPL-SCNC: 27 MMOL/L (ref 21–32)
COHGB MFR BLDA: 3.3 % (ref 0–1.5)
COLOR UR: ABNORMAL
CREAT SERPL-MCNC: 0.6 MG/DL (ref 0.8–1.3)
DEPRECATED RDW RBC AUTO: 18.1 % (ref 12.4–15.4)
EOSINOPHIL # BLD: 0.1 K/UL (ref 0–0.6)
EOSINOPHIL NFR BLD: 1.4 %
EPI CELLS #/AREA URNS AUTO: 1 /HPF (ref 0–5)
GFR SERPLBLD CREATININE-BSD FMLA CKD-EPI: >60 ML/MIN/{1.73_M2}
GLUCOSE SERPL-MCNC: 112 MG/DL (ref 70–99)
GLUCOSE UR STRIP.AUTO-MCNC: NEGATIVE MG/DL
HCO3 BLDA-SCNC: 23.5 MMOL/L (ref 21–29)
HCT VFR BLD AUTO: 38.1 % (ref 40.5–52.5)
HGB BLD-MCNC: 12.6 G/DL (ref 13.5–17.5)
HGB BLDA-MCNC: 12.2 G/DL (ref 13.5–17.5)
HGB UR QL STRIP.AUTO: NEGATIVE
HYALINE CASTS #/AREA URNS AUTO: 5 /LPF (ref 0–8)
INR PPP: 1.06 (ref 0.84–1.16)
KETONES UR STRIP.AUTO-MCNC: NEGATIVE MG/DL
LEUKOCYTE ESTERASE UR QL STRIP.AUTO: ABNORMAL
LYMPHOCYTES # BLD: 1.2 K/UL (ref 1–5.1)
LYMPHOCYTES NFR BLD: 30.9 %
MCH RBC QN AUTO: 29.2 PG (ref 26–34)
MCHC RBC AUTO-ENTMCNC: 33.1 G/DL (ref 31–36)
MCV RBC AUTO: 88 FL (ref 80–100)
METHGB MFR BLDA: 0.2 %
MONOCYTES # BLD: 0.3 K/UL (ref 0–1.3)
MONOCYTES NFR BLD: 8.8 %
NEUTROPHILS # BLD: 2.2 K/UL (ref 1.7–7.7)
NEUTROPHILS NFR BLD: 57.8 %
NITRITE UR QL STRIP.AUTO: NEGATIVE
O2 THERAPY: ABNORMAL
PCO2 BLDA: 38.5 MMHG (ref 35–45)
PH BLDA: 7.39 [PH] (ref 7.35–7.45)
PH UR STRIP.AUTO: 5.5 [PH] (ref 5–8)
PHOSPHATE SERPL-MCNC: 3.2 MG/DL (ref 2.5–4.9)
PLATELET # BLD AUTO: 300 K/UL (ref 135–450)
PMV BLD AUTO: 8.5 FL (ref 5–10.5)
PO2 BLDA: 151 MMHG (ref 75–108)
POTASSIUM SERPL-SCNC: 3.2 MMOL/L (ref 3.5–5.1)
PROT UR STRIP.AUTO-MCNC: NEGATIVE MG/DL
PROTHROMBIN TIME: 13.8 SEC (ref 11.5–14.8)
RBC # BLD AUTO: 4.33 M/UL (ref 4.2–5.9)
RBC CLUMPS #/AREA URNS AUTO: 2 /HPF (ref 0–4)
SAO2 % BLDA: 99.7 %
SODIUM SERPL-SCNC: 138 MMOL/L (ref 136–145)
SP GR UR STRIP.AUTO: 1.02 (ref 1–1.03)
UA COMPLETE W REFLEX CULTURE PNL UR: ABNORMAL
UA DIPSTICK W REFLEX MICRO PNL UR: YES
URN SPEC COLLECT METH UR: ABNORMAL
UROBILINOGEN UR STRIP-ACNC: 1 E.U./DL
WBC # BLD AUTO: 3.8 K/UL (ref 4–11)
WBC #/AREA URNS AUTO: 2 /HPF (ref 0–5)

## 2023-09-14 PROCEDURE — 86901 BLOOD TYPING SEROLOGIC RH(D): CPT

## 2023-09-14 PROCEDURE — 80069 RENAL FUNCTION PANEL: CPT

## 2023-09-14 PROCEDURE — 86900 BLOOD TYPING SEROLOGIC ABO: CPT

## 2023-09-14 PROCEDURE — 86850 RBC ANTIBODY SCREEN: CPT

## 2023-09-14 PROCEDURE — 71046 X-RAY EXAM CHEST 2 VIEWS: CPT

## 2023-09-14 PROCEDURE — 85025 COMPLETE CBC W/AUTO DIFF WBC: CPT

## 2023-09-14 PROCEDURE — 94010 BREATHING CAPACITY TEST: CPT

## 2023-09-14 PROCEDURE — 93971 EXTREMITY STUDY: CPT

## 2023-09-14 PROCEDURE — 85610 PROTHROMBIN TIME: CPT

## 2023-09-14 PROCEDURE — 85730 THROMBOPLASTIN TIME PARTIAL: CPT

## 2023-09-14 PROCEDURE — 36415 COLL VENOUS BLD VENIPUNCTURE: CPT

## 2023-09-14 PROCEDURE — 82803 BLOOD GASES ANY COMBINATION: CPT

## 2023-09-14 PROCEDURE — 81001 URINALYSIS AUTO W/SCOPE: CPT

## 2023-09-14 ASSESSMENT — PAIN SCALES - GENERAL: PAINLEVEL_OUTOF10: 0

## 2023-09-14 ASSESSMENT — ENCOUNTER SYMPTOMS: SHORTNESS OF BREATH: 1

## 2023-09-14 ASSESSMENT — LIFESTYLE VARIABLES: SMOKING_STATUS: 1

## 2023-09-14 NOTE — PROGRESS NOTES
Pt here for PAT visit. Consents for procedure and blood signed by pt and in chart. Labs drawn and urine collected and sent to lab for testing as ordered. Pt seen by Dr. Susan Alonso from Anesthesia and questions answered. Vitals stable and documented in flowsheet. Pt instructed to be NPO after midnight prior to procedure and states understanding. Pt provided with hibiclens and instructed to shower with entire bottle the night prior to procedure. Pt instructed to take the following medications the morning of procedure with a small sip of water: metoprolol. Pt instructed to stop taking plavix prior to procedure on 9/19 and states understanding. EKG completed and results in chart. Pt instructed to arrive to the hospital the morning of procedure at 0530 on 9/27/23. At completion of PAT pt taken to outpatient diagnostics for chest x-ray. Pt in good condition and okay for discharge after completion of x-ray. Pt denies further questions at time of discharge. Pt instructed to call Dr. Lourdes Gibson office with any medical concerns or the heart office at [phone] with any further questions between now and day of procedure.

## 2023-09-14 NOTE — H&P
Cardiothoracic H&P           PCP: Jazmin Noyola    Referring Physician: Dr. Francois Montero    DIAGNOSIS:  multivessel coronary artery diease    CHIEF COMPLAINT:  dyspnea on exertion    History Obtained From:  patient, electronic medical record    HISTORY OF PRESENT ILLNESS:    The patient is a 75 y.o. male with no significant past medical history, who presents with some increasing dyspnea on exertion and fatigability that led to stress testing. Stress test was abnormal but stopped prematurely because of claudication and this led to left heart catheterization with coronary angiography. Coronary angiography demonstrates three-vessel coronary disease not entirely amenable to PCI and the patient is referred for surgical opinion. He has no chest pain with exertion. To his knowledge he has not had a cardiac history and in his mind he is in good shape and exercises regularly. No changes in previous H&P from 7/20/23 except that patient recently underwent cataract surgery. Past Medical History:        Diagnosis Date    CAD (coronary artery disease)     Glaucoma     Hypertension        Past Surgical History:        Procedure Laterality Date    ARM SURGERY      CATARACT EXTRACTION      DIAGNOSTIC CARDIAC CATH LAB PROCEDURE         Medications:   Home Meds:   Prior to Admission medications    Medication Sig Start Date End Date Taking? Authorizing Provider   diclofenac sodium (VOLTAREN) 1 % GEL  7/7/23   Historical Provider, MD   mirtazapine (REMERON) 7.5 MG tablet Take 1 tablet by mouth nightly at bedtime.   Patient not taking: Reported on 9/14/2023 7/7/23   Historical Provider, MD   metoprolol succinate (TOPROL XL) 25 MG extended release tablet Take 1 tablet by mouth daily 7/17/23   Dene Phoenix, MD   clopidogrel (PLAVIX) 75 MG tablet Take 1 tablet by mouth daily 6/14/23   Dene Phoenix, MD   Cholecalciferol 50 MCG (2000 UT) TABS Take 1 tablet by mouth daily  Patient not taking: Reported on 9/14/2023 5/16/23   RPDA- L to R collaterals to LAD  LVEF- 50%  LVG- inferobasal akinesis  LVEDP- 2     ASSESSMENT AND PLAN:     STS Cardiac Surgery Risk profile: CABG surgery     Mortality: 0.66%  Renal Failure: 0.35%  Permanent Stroke: 0.60%  Prolonged Ventilation: 3.76%  Deep Sternal Infection: 0.13%  Reoperation: 3.97%  Morbidity or Mortality: 6.40%  Short LOS: 59.10%  Long LOS: 104%     50-year-old male presents with asymptomatic coronary artery disease for a discussion about CABG surgery. He wanted know his options of PCI versus CABG. Based on my exam of the patient and review of his imaging studies I did recommend CABG surgery for completeness of revascularization. We had discussion of risks and benefits as outlined  above and below. Surgery has been recommended and discussed with the patient. I have discussed the risks, benefits and alternatives with patient, including risks of infection, bleeding, MI, stroke, arrhythmias and an operative mortality risk of 1-2% as outlined in the STS Cardiac Surgery Risk profile above. I also discussed the the alternative of not doing surgery and the consequences of that action. Questions have been answered and the patient is willing to proceed. I also had a discussion regarding secondary risk modification with him, addressing the need for immediate tobacco cessation, weight loss through dietary modification and exercise, and will continue to have those conversations with him post op. Surgery to be scheduled for a future date pending his cataract surgery. He wants to get his cataract surgery done before his CABG surgery which is reasonable so that his Plavix medication does not need to be interrupted. Carotid duplex scan. Vein mapping. PFTs. He had his cataract surgery recently.     Electronically signed by GERALD Meade CNP on 9/14/2023 at 11:52 AM

## 2023-09-14 NOTE — ANESTHESIA PRE PROCEDURE
07/12/2023 12:45 PM    K 4.0 07/12/2023 12:45 PM     07/12/2023 12:45 PM    CO2 23 07/12/2023 12:45 PM    BUN 9 07/12/2023 12:45 PM    CREATININE <0.5 07/12/2023 12:45 PM    LABGLOM >60 07/12/2023 12:45 PM    GLUCOSE 109 07/12/2023 12:45 PM    CALCIUM 9.5 07/12/2023 12:45 PM       POC Tests: No results for input(s): \"POCGLU\", \"POCNA\", \"POCK\", \"POCCL\", \"POCBUN\", \"POCHEMO\", \"POCHCT\" in the last 72 hours. Coags: No results found for: \"PROTIME\", \"INR\", \"APTT\"    HCG (If Applicable): No results found for: \"PREGTESTUR\", \"PREGSERUM\", \"HCG\", \"HCGQUANT\"     ABGs: No results found for: \"PHART\", \"PO2ART\", \"CKO9FYE\", \"NXJ7VQI\", \"BEART\", \"L6BVYERT\"     Type & Screen (If Applicable):  No results found for: \"LABABO\", \"LABRH\"    Drug/Infectious Status (If Applicable):  No results found for: \"HIV\", \"HEPCAB\"    COVID-19 Screening (If Applicable): No results found for: \"COVID19\"        Anesthesia Evaluation  Patient summary reviewed and Nursing notes reviewed  Airway: Mallampati: I  TM distance: >3 FB   Neck ROM: full  Mouth opening: > = 3 FB   Dental: normal exam         Pulmonary: breath sounds clear to auscultation  (+) COPD:  shortness of breath:  current smoker                           Cardiovascular:    (+) hypertension:, angina:, past MI:, CAD: obstructive,       ECG reviewed  Rhythm: regular  Rate: normal  Echocardiogram reviewed  Stress test reviewed  Cleared by cardiology           ROS comment: Conclusions      Summary   -Normal left ventricle size, mild to moderate concentric wall thickness, and   normal systolic function with an estimated ejection fraction of 55%. -There is mild to moderate hypokinesis of the basal inferoseptal walls with   aneurysmal defect.   -Indeterminate diastolic function. E/e\"=10.35. -GLS-15.1% (abnormal)   -Mitral annular calcification is present.   -Calcification of the mitral valve noted.    -Mild mitral regurgitation.   -The aortic valve is mildly thickened/calcified but opens well

## 2023-09-20 ENCOUNTER — ANESTHESIA (OUTPATIENT)
Dept: OPERATING ROOM | Age: 73
End: 2023-09-20

## 2023-09-25 ENCOUNTER — TELEPHONE (OUTPATIENT)
Age: 73
End: 2023-09-25

## 2023-09-25 NOTE — TELEPHONE ENCOUNTER
Patient is scheduled for CABG,SINDY clip on 09/27/23 with Dr. Vinny Walker. He called today to report that he is running a fever of 101.5, is fatigued and has a sore throat. I reviewed with Dr. Vinny Walker and patient will need to reschedule surgery. Patient advised to get a COVID test and to call his PCP. He will need to let us know the results of his COVID test so that we can reschedule surgery appropriately. Patient instructed to restart his medications that he had been holding for surgery. Patient is aware and agreeable with plan.

## 2023-09-26 ENCOUNTER — TELEPHONE (OUTPATIENT)
Age: 73
End: 2023-09-26

## 2023-09-26 NOTE — TELEPHONE ENCOUNTER
I called patient to follow up and see if he was able to get a Covid test.  [facility] and awaiting call back.

## 2023-10-10 RX ORDER — CLOPIDOGREL BISULFATE 75 MG/1
75 TABLET ORAL DAILY
Qty: 30 TABLET | Refills: 3 | Status: CANCELLED | OUTPATIENT
Start: 2023-10-10

## 2023-10-10 NOTE — TELEPHONE ENCOUNTER
Received refill request for clopidogrel (PLAVIX) 75 MG tablet     from [facility] pharmacy.     Last ov:08/06/2023      Last Refill:06/14/2023    Next appointment:11/15/2023

## 2023-10-13 ENCOUNTER — TELEPHONE (OUTPATIENT)
Age: 73
End: 2023-10-13

## 2023-10-13 NOTE — TELEPHONE ENCOUNTER
Pt previously sched for surgery which was cancelled due to illness. He tested Covid negative but did have the flu. He is still recovering and wishes to wait until he is feeling better to sched. He was notified that Dr. Mckenzie Horowitz has retired and that will sched with Dr. Charly Mtz. Verbalized acceptance. Will call back to sched.

## 2023-10-16 RX ORDER — CLOPIDOGREL BISULFATE 75 MG/1
75 TABLET ORAL DAILY
Qty: 30 TABLET | Refills: 3 | Status: CANCELLED | OUTPATIENT
Start: 2023-10-16

## 2023-10-25 ENCOUNTER — TELEPHONE (OUTPATIENT)
Dept: CARDIOLOGY CLINIC | Age: 73
End: 2023-10-25

## 2023-10-26 ENCOUNTER — TELEPHONE (OUTPATIENT)
Dept: CARDIOLOGY CLINIC | Age: 73
End: 2023-10-26

## 2023-10-26 RX ORDER — CLOPIDOGREL BISULFATE 75 MG/1
75 TABLET ORAL DAILY
Qty: 30 TABLET | Refills: 3 | Status: SHIPPED | OUTPATIENT
Start: 2023-10-26

## 2023-10-26 NOTE — TELEPHONE ENCOUNTER
Called patient and discussed his surgery, he is aware of Johnson De La Garza retiring. He was scheduled for his CABG and a couple days prior came down with the flu and had to cancel. He has been in touch with their office to get rescheduled with another surgeon but has been waiting until he felt completely better. I advised that he should call and get scheduled and continue to heal in the meantime. Script for plavix sent to verified pharmacy and pt advised numerous times on the call about stopping it 5 days prior to his surgery. Pt verbalized understanding and was agreeable to all of the above. No further questions at this time.

## 2023-12-08 ENCOUNTER — TELEPHONE (OUTPATIENT)
Age: 73
End: 2023-12-08

## 2023-12-08 ENCOUNTER — OFFICE VISIT (OUTPATIENT)
Age: 73
End: 2023-12-08
Payer: MEDICARE

## 2023-12-08 ENCOUNTER — TELEPHONE (OUTPATIENT)
Dept: CARDIOLOGY CLINIC | Age: 73
End: 2023-12-08

## 2023-12-08 VITALS
TEMPERATURE: 97.9 F | OXYGEN SATURATION: 98 % | WEIGHT: 135 LBS | HEART RATE: 98 BPM | HEIGHT: 69 IN | BODY MASS INDEX: 19.99 KG/M2 | SYSTOLIC BLOOD PRESSURE: 90 MMHG | DIASTOLIC BLOOD PRESSURE: 60 MMHG

## 2023-12-08 DIAGNOSIS — I25.10 CAD IN NATIVE ARTERY: Primary | ICD-10-CM

## 2023-12-08 PROCEDURE — G8420 CALC BMI NORM PARAMETERS: HCPCS | Performed by: THORACIC SURGERY (CARDIOTHORACIC VASCULAR SURGERY)

## 2023-12-08 PROCEDURE — 99215 OFFICE O/P EST HI 40 MIN: CPT | Performed by: THORACIC SURGERY (CARDIOTHORACIC VASCULAR SURGERY)

## 2023-12-08 PROCEDURE — 4004F PT TOBACCO SCREEN RCVD TLK: CPT | Performed by: THORACIC SURGERY (CARDIOTHORACIC VASCULAR SURGERY)

## 2023-12-08 PROCEDURE — G8484 FLU IMMUNIZE NO ADMIN: HCPCS | Performed by: THORACIC SURGERY (CARDIOTHORACIC VASCULAR SURGERY)

## 2023-12-08 PROCEDURE — 3017F COLORECTAL CA SCREEN DOC REV: CPT | Performed by: THORACIC SURGERY (CARDIOTHORACIC VASCULAR SURGERY)

## 2023-12-08 PROCEDURE — 1123F ACP DISCUSS/DSCN MKR DOCD: CPT | Performed by: THORACIC SURGERY (CARDIOTHORACIC VASCULAR SURGERY)

## 2023-12-08 PROCEDURE — G8427 DOCREV CUR MEDS BY ELIG CLIN: HCPCS | Performed by: THORACIC SURGERY (CARDIOTHORACIC VASCULAR SURGERY)

## 2023-12-08 ASSESSMENT — ENCOUNTER SYMPTOMS
RESPIRATORY NEGATIVE: 1
GASTROINTESTINAL NEGATIVE: 1
ALLERGIC/IMMUNOLOGIC NEGATIVE: 1
EYES NEGATIVE: 1

## 2023-12-08 NOTE — TELEPHONE ENCOUNTER
Pt states he needs clarification concerning his surgery. Surgeon did not believe pt needs sx and stated he would confer with [facility], pt mentioned having a stent placed. Would like for someone to call and discuss his options.

## 2023-12-08 NOTE — TELEPHONE ENCOUNTER
I spoke to Dimas Vieira at length. He expressed frustration over the different opinions re his heart surgery. He understands that he may need a stent at some point and will continue taking his meds as prescribed. He agreed to see Dr. Mike Hays at [facility] in January (jarvis't made 1/17/23). He will call if symptoms present christopher chest pain or CALDERON. His biggest complaint is his leg pain; he stops to rest when walking, then pain abates, and he can walk some more. He will call Dr. Alan Marshall next week to update him on him not having bypass surgery; he wants his legs treated to alleviate the pain. He said he is working on smoking cessation and walking more.

## 2023-12-26 ENCOUNTER — HOSPITAL ENCOUNTER (OUTPATIENT)
Dept: NON INVASIVE DIAGNOSTICS | Age: 73
Discharge: HOME | End: 2023-12-26
Attending: INTERNAL MEDICINE
Payer: MEDICARE

## 2023-12-26 ENCOUNTER — TELEPHONE (OUTPATIENT)
Dept: CARDIOLOGY CLINIC | Age: 73
End: 2023-12-26

## 2023-12-26 DIAGNOSIS — I25.10 CORONARY ARTERY DISEASE DUE TO LIPID RICH PLAQUE: ICD-10-CM

## 2023-12-26 DIAGNOSIS — I25.83 CORONARY ARTERY DISEASE DUE TO LIPID RICH PLAQUE: ICD-10-CM

## 2023-12-26 DIAGNOSIS — R94.39 ABNORMAL CARDIOVASCULAR STRESS TEST: Primary | ICD-10-CM

## 2023-12-26 DIAGNOSIS — I25.10 CAD IN NATIVE ARTERY: ICD-10-CM

## 2023-12-26 DIAGNOSIS — R06.09 DOE (DYSPNEA ON EXERTION): ICD-10-CM

## 2023-12-26 PROCEDURE — 78452 HT MUSCLE IMAGE SPECT MULT: CPT | Performed by: INTERNAL MEDICINE

## 2023-12-26 PROCEDURE — 3430000000 HC RX DIAGNOSTIC RADIOPHARMACEUTICAL: Performed by: INTERNAL MEDICINE

## 2023-12-26 PROCEDURE — 6360000002 HC RX W HCPCS: Performed by: INTERNAL MEDICINE

## 2023-12-26 PROCEDURE — 93017 CV STRESS TEST TRACING ONLY: CPT | Performed by: INTERNAL MEDICINE

## 2023-12-26 PROCEDURE — A9502 TC99M TETROFOSMIN: HCPCS | Performed by: INTERNAL MEDICINE

## 2023-12-26 RX ORDER — REGADENOSON 0.08 MG/ML
0.4 INJECTION, SOLUTION INTRAVENOUS
Status: COMPLETED | OUTPATIENT
Start: 2023-12-26 | End: 2023-12-26

## 2023-12-26 RX ORDER — REGADENOSON 0.08 MG/ML
0.4 INJECTION, SOLUTION INTRAVENOUS
OUTPATIENT
Start: 2023-12-26

## 2023-12-26 RX ADMIN — REGADENOSON 0.4 MG: 0.08 INJECTION, SOLUTION INTRAVENOUS at 13:38

## 2023-12-26 RX ADMIN — TETROFOSMIN 10 MILLICURIE: 1.38 INJECTION, POWDER, LYOPHILIZED, FOR SOLUTION INTRAVENOUS at 12:42

## 2023-12-26 RX ADMIN — TETROFOSMIN 30 MILLICURIE: 1.38 INJECTION, POWDER, LYOPHILIZED, FOR SOLUTION INTRAVENOUS at 13:41

## 2023-12-26 NOTE — TELEPHONE ENCOUNTER
----- Message from New Au MD sent at 12/26/2023  3:32 PM EST -----  Regarding: stress abnormal  Please let him know his small vessel that is 90% blocked shows an area of decreased blood flow on stress testing, so we should stent it.  Fine with me to wait until Dr. Mccann has seen him first.  This does not appear to be life-threatening but he may feel better if we open this artery.

## 2023-12-27 NOTE — TELEPHONE ENCOUNTER
Patient called back to discuss abnormal stress test. He would like to proceed with [facility]. Order placed.    Please call and schedule him with any interventional doctor.

## 2023-12-28 NOTE — TELEPHONE ENCOUNTER
Date of Procedure: Friday 1/12/24 @ [facility] with Dr. Correa     Time of arrival: 6:45 am     Procedure time: 8:00 am     Called and spoke to Ezra and he is agreeable to date and time. Reviewed instructions and he verbalized understanding. Encouraged to call with any questions or concerns.     Published on Unfold, scheduled in Epic and e-mailed to cath lab.

## 2024-01-02 ENCOUNTER — OFFICE VISIT (OUTPATIENT)
Dept: VASCULAR SURGERY | Age: 74
End: 2024-01-02
Payer: MEDICARE

## 2024-01-02 VITALS
HEIGHT: 69 IN | SYSTOLIC BLOOD PRESSURE: 128 MMHG | WEIGHT: 145 LBS | DIASTOLIC BLOOD PRESSURE: 78 MMHG | BODY MASS INDEX: 21.48 KG/M2

## 2024-01-02 DIAGNOSIS — I70.213 ATHEROSCLEROSIS OF NATIVE ARTERIES OF EXTREMITIES WITH INTERMITTENT CLAUDICATION, BILATERAL LEGS (HCC): Primary | ICD-10-CM

## 2024-01-02 PROCEDURE — 4004F PT TOBACCO SCREEN RCVD TLK: CPT | Performed by: SURGERY

## 2024-01-02 PROCEDURE — 3017F COLORECTAL CA SCREEN DOC REV: CPT | Performed by: SURGERY

## 2024-01-02 PROCEDURE — G8420 CALC BMI NORM PARAMETERS: HCPCS | Performed by: SURGERY

## 2024-01-02 PROCEDURE — 99213 OFFICE O/P EST LOW 20 MIN: CPT | Performed by: SURGERY

## 2024-01-02 PROCEDURE — G8484 FLU IMMUNIZE NO ADMIN: HCPCS | Performed by: SURGERY

## 2024-01-02 PROCEDURE — 1123F ACP DISCUSS/DSCN MKR DOCD: CPT | Performed by: SURGERY

## 2024-01-02 PROCEDURE — G8427 DOCREV CUR MEDS BY ELIG CLIN: HCPCS | Performed by: SURGERY

## 2024-01-02 NOTE — PROGRESS NOTES
[facility]   Vascular Surgery Followup    Referring Provider:  Jazmin Noyola (Inactive)     No chief complaint on file.       History of Present Illness:  73-year-old male presents today in follow-up with known history of peripheral vascular disease and lower extremity claudication.  He was initially seen in July of this year with CT scan showing moderate multifocal areas of narrowing in the proximal to mid SFA and popliteal on the right as well as moderate to severe narrowing in the mid left SFA.  At that time he was being evaluated for possible CABG. he has significant claudication in both calves at 50 yards.  He states his right might be slightly worse than his left.  He is scheduled to undergo coronary stent January 12.    Past Medical History:   has a past medical history of CAD (coronary artery disease), Glaucoma, and Hypertension.    Surgical History:   has a past surgical history that includes Diagnostic Cardiac Cath Lab Procedure; Arm Surgery; and Cataract extraction.     Social History:   reports that he has been smoking cigarettes. He has a 55.0 pack-year smoking history. He has never used smokeless tobacco. He reports current alcohol use. He reports that he does not currently use drugs.     Family History:  family history includes Cancer in his father.     Home Medications:  Current Outpatient Medications   Medication Sig Dispense Refill    rosuvastatin (CRESTOR) 10 MG tablet Take 1 tablet by mouth daily 90 tablet 1    clopidogrel (PLAVIX) 75 MG tablet Take 1 tablet by mouth daily 30 tablet 3    diclofenac sodium (VOLTAREN) 1 % GEL  (Patient not taking: Reported on 12/8/2023)      mirtazapine (REMERON) 7.5 MG tablet Take 1 tablet by mouth nightly at bedtime. (Patient not taking: Reported on 12/8/2023)      metoprolol succinate (TOPROL XL) 25 MG extended release tablet Take 1 tablet by mouth daily 90 tablet 3    Cholecalciferol 50 MCG (2000 UT) TABS Take 1 tablet by mouth daily      fluticasone (FLONASE)

## 2024-01-03 ENCOUNTER — PREP FOR PROCEDURE (OUTPATIENT)
Dept: VASCULAR SURGERY | Age: 74
End: 2024-01-03

## 2024-01-05 RX ORDER — SODIUM CHLORIDE 9 MG/ML
INJECTION, SOLUTION INTRAVENOUS PRN
Status: CANCELLED | OUTPATIENT
Start: 2024-01-05

## 2024-01-05 RX ORDER — SODIUM CHLORIDE 0.9 % (FLUSH) 0.9 %
5-40 SYRINGE (ML) INJECTION PRN
Status: CANCELLED | OUTPATIENT
Start: 2024-01-05

## 2024-01-05 RX ORDER — SODIUM CHLORIDE 0.9 % (FLUSH) 0.9 %
5-40 SYRINGE (ML) INJECTION EVERY 12 HOURS SCHEDULED
Status: CANCELLED | OUTPATIENT
Start: 2024-01-05

## 2024-01-12 ENCOUNTER — HOSPITAL ENCOUNTER (OUTPATIENT)
Dept: CARDIAC CATH/INVASIVE PROCEDURES | Age: 74
Discharge: HOME | End: 2024-01-12
Attending: INTERNAL MEDICINE | Admitting: INTERNAL MEDICINE
Payer: MEDICARE

## 2024-01-12 ENCOUNTER — TELEPHONE (OUTPATIENT)
Dept: CARDIOLOGY CLINIC | Age: 74
End: 2024-01-12

## 2024-01-12 VITALS
WEIGHT: 142 LBS | RESPIRATION RATE: 16 BRPM | DIASTOLIC BLOOD PRESSURE: 63 MMHG | SYSTOLIC BLOOD PRESSURE: 149 MMHG | OXYGEN SATURATION: 100 % | HEIGHT: 69 IN | HEART RATE: 57 BPM | BODY MASS INDEX: 21.03 KG/M2

## 2024-01-12 DIAGNOSIS — I25.10 CAD IN NATIVE ARTERY: Primary | ICD-10-CM

## 2024-01-12 LAB
ANION GAP SERPL CALCULATED.3IONS-SCNC: 9 MMOL/L (ref 3–16)
BUN SERPL-MCNC: 12 MG/DL (ref 7–20)
CALCIUM SERPL-MCNC: 9.2 MG/DL (ref 8.3–10.6)
CHLORIDE SERPL-SCNC: 99 MMOL/L (ref 99–110)
CO2 SERPL-SCNC: 27 MMOL/L (ref 21–32)
CREAT SERPL-MCNC: 0.6 MG/DL (ref 0.8–1.3)
DEPRECATED RDW RBC AUTO: 20.9 % (ref 12.4–15.4)
EKG ATRIAL RATE: 54 BPM
EKG ATRIAL RATE: 62 BPM
EKG DIAGNOSIS: NORMAL
EKG DIAGNOSIS: NORMAL
EKG P AXIS: 46 DEGREES
EKG P AXIS: 52 DEGREES
EKG P-R INTERVAL: 166 MS
EKG P-R INTERVAL: 182 MS
EKG Q-T INTERVAL: 438 MS
EKG Q-T INTERVAL: 472 MS
EKG QRS DURATION: 102 MS
EKG QRS DURATION: 96 MS
EKG QTC CALCULATION (BAZETT): 444 MS
EKG QTC CALCULATION (BAZETT): 447 MS
EKG R AXIS: -15 DEGREES
EKG R AXIS: -20 DEGREES
EKG T AXIS: -27 DEGREES
EKG T AXIS: -6 DEGREES
EKG VENTRICULAR RATE: 54 BPM
EKG VENTRICULAR RATE: 62 BPM
GFR SERPLBLD CREATININE-BSD FMLA CKD-EPI: >60 ML/MIN/{1.73_M2}
GLUCOSE SERPL-MCNC: 104 MG/DL (ref 70–99)
HCT VFR BLD AUTO: 34.7 % (ref 40.5–52.5)
HGB BLD-MCNC: 11.8 G/DL (ref 13.5–17.5)
MCH RBC QN AUTO: 28.9 PG (ref 26–34)
MCHC RBC AUTO-ENTMCNC: 33.9 G/DL (ref 31–36)
MCV RBC AUTO: 85.3 FL (ref 80–100)
PLATELET # BLD AUTO: 254 K/UL (ref 135–450)
PMV BLD AUTO: 7.9 FL (ref 5–10.5)
POC ACT LR: 207 SEC
POC ACT LR: 274 SEC
POC ACT LR: 318 SEC
POTASSIUM SERPL-SCNC: 4.2 MMOL/L (ref 3.5–5.1)
RBC # BLD AUTO: 4.07 M/UL (ref 4.2–5.9)
SODIUM SERPL-SCNC: 135 MMOL/L (ref 136–145)
WBC # BLD AUTO: 4.4 K/UL (ref 4–11)

## 2024-01-12 PROCEDURE — 99152 MOD SED SAME PHYS/QHP 5/>YRS: CPT

## 2024-01-12 PROCEDURE — C1874 STENT, COATED/COV W/DEL SYS: HCPCS

## 2024-01-12 PROCEDURE — 6370000000 HC RX 637 (ALT 250 FOR IP)

## 2024-01-12 PROCEDURE — 99152 MOD SED SAME PHYS/QHP 5/>YRS: CPT | Performed by: INTERNAL MEDICINE

## 2024-01-12 PROCEDURE — 80048 BASIC METABOLIC PNL TOTAL CA: CPT

## 2024-01-12 PROCEDURE — C9600 PERC DRUG-EL COR STENT SING: HCPCS

## 2024-01-12 PROCEDURE — 93010 ELECTROCARDIOGRAM REPORT: CPT | Performed by: INTERNAL MEDICINE

## 2024-01-12 PROCEDURE — 85347 COAGULATION TIME ACTIVATED: CPT

## 2024-01-12 PROCEDURE — 99153 MOD SED SAME PHYS/QHP EA: CPT

## 2024-01-12 PROCEDURE — 93571 IV DOP VEL&/PRESS C FLO 1ST: CPT | Performed by: INTERNAL MEDICINE

## 2024-01-12 PROCEDURE — C1769 GUIDE WIRE: HCPCS

## 2024-01-12 PROCEDURE — 6360000002 HC RX W HCPCS

## 2024-01-12 PROCEDURE — 6370000000 HC RX 637 (ALT 250 FOR IP): Performed by: INTERNAL MEDICINE

## 2024-01-12 PROCEDURE — 2580000003 HC RX 258

## 2024-01-12 PROCEDURE — 92928 PRQ TCAT PLMT NTRAC ST 1 LES: CPT | Performed by: INTERNAL MEDICINE

## 2024-01-12 PROCEDURE — 85027 COMPLETE CBC AUTOMATED: CPT

## 2024-01-12 PROCEDURE — C1887 CATHETER, GUIDING: HCPCS

## 2024-01-12 PROCEDURE — 2500000003 HC RX 250 WO HCPCS

## 2024-01-12 PROCEDURE — C1894 INTRO/SHEATH, NON-LASER: HCPCS

## 2024-01-12 PROCEDURE — C1725 CATH, TRANSLUMIN NON-LASER: HCPCS

## 2024-01-12 PROCEDURE — 93571 IV DOP VEL&/PRESS C FLO 1ST: CPT

## 2024-01-12 PROCEDURE — 93005 ELECTROCARDIOGRAM TRACING: CPT | Performed by: INTERNAL MEDICINE

## 2024-01-12 PROCEDURE — 36415 COLL VENOUS BLD VENIPUNCTURE: CPT

## 2024-01-12 RX ORDER — ROSUVASTATIN CALCIUM 20 MG/1
20 TABLET, COATED ORAL DAILY
Qty: 90 TABLET | Refills: 3 | Status: SHIPPED | OUTPATIENT
Start: 2024-01-12

## 2024-01-12 RX ORDER — SODIUM CHLORIDE 0.9 % (FLUSH) 0.9 %
5-40 SYRINGE (ML) INJECTION PRN
Status: DISCONTINUED | OUTPATIENT
Start: 2024-01-12 | End: 2024-01-12 | Stop reason: HOSPADM

## 2024-01-12 RX ORDER — ONDANSETRON 2 MG/ML
4 INJECTION INTRAMUSCULAR; INTRAVENOUS EVERY 6 HOURS PRN
Status: DISCONTINUED | OUTPATIENT
Start: 2024-01-12 | End: 2024-01-12 | Stop reason: HOSPADM

## 2024-01-12 RX ORDER — ASPIRIN 81 MG/1
81 TABLET ORAL DAILY
Qty: 90 TABLET | Refills: 1 | Status: SHIPPED | OUTPATIENT
Start: 2024-01-12

## 2024-01-12 RX ORDER — SODIUM CHLORIDE 9 MG/ML
INJECTION, SOLUTION INTRAVENOUS PRN
Status: DISCONTINUED | OUTPATIENT
Start: 2024-01-12 | End: 2024-01-12 | Stop reason: HOSPADM

## 2024-01-12 RX ORDER — SODIUM CHLORIDE 9 MG/ML
INJECTION, SOLUTION INTRAVENOUS CONTINUOUS
Status: DISCONTINUED | OUTPATIENT
Start: 2024-01-12 | End: 2024-01-12 | Stop reason: HOSPADM

## 2024-01-12 RX ORDER — SODIUM CHLORIDE 0.9 % (FLUSH) 0.9 %
5-40 SYRINGE (ML) INJECTION EVERY 12 HOURS SCHEDULED
Status: DISCONTINUED | OUTPATIENT
Start: 2024-01-12 | End: 2024-01-12 | Stop reason: HOSPADM

## 2024-01-12 RX ORDER — ACETAMINOPHEN 325 MG/1
650 TABLET ORAL EVERY 4 HOURS PRN
Status: DISCONTINUED | OUTPATIENT
Start: 2024-01-12 | End: 2024-01-12 | Stop reason: HOSPADM

## 2024-01-12 RX ORDER — ASPIRIN 325 MG
325 TABLET ORAL ONCE
Status: COMPLETED | OUTPATIENT
Start: 2024-01-12 | End: 2024-01-12

## 2024-01-12 RX ADMIN — ASPIRIN 325 MG: 325 TABLET ORAL at 09:25

## 2024-01-12 NOTE — H&P
Outside/Care everywhere records Reviewed  Labs Reviewed  Prior Imaging, ekg, cath, echo reviewed when available  Medications reviewed  Old Notes reviewed  ASSESSMENT AND PLAN     Encounter Diagnoses   Name Primary?    Claudication (HCC) Yes    Anginal equivalent     CAD in native artery     CALDERON (dyspnea on exertion)     Smoker     SOB (shortness of breath)     Dizziness     Atherosclerosis of native arteries of extremities with intermittent claudication, left leg (HCC)     Other fatigue      Today's visit was very long.  Ezra has trouble understanding things sometimes, and is also difficult to redirect in order to provide information or ask question through his many interjections.  He is understandably confused as to why he was supposed to have a CABG that was canceled.    I think he is a visual learner, so I spent quite a while showing him his imaging to better explain what was going on.    I showed him all his cath films and reviewed that he essentially has 3 blockages, of which only one might actually matter.  His RCA is a , with clearly akinetic RCA territory on LV gram, thus there would be no benefit in reopening this vessel, especially since he already has a lot of collaterals, and doesn't really have symptoms.  His LAD, although heavily calcified, is only moderate.  His OM is high-grade, and could be easily stented, but may not require any stenting.    Given that several months have elapsed, it would make sense to do a stress perfusion study to see if there is any real benefit of opening up his OM and/or if his LAD might have worsened since July.  He can't exercise but we can do a Lexiscan.    I reviewed his abdominal CTA runoff with him.  I showed him his diffuse severe calcifications.  He enquired what could be done to help prevent plaque formation, and I discussed statin therapy, which he is now agreeable to taking.    I personally called Dr. Mccann today and also sent him a chart message as patient  needs an urgent appointment with vascular to determine what the next steps are to improve his claudications symptoms.    Summary of Assessment and Plan:  12/18/23  Lexiscan  Continue aspirin, Plavix, blocker  Repeat lipid panel  Start crestor 10 mg daily, uptitrate as tolerated until LDL < 70  Continue to work on smoking cessation    Continue risk factor modifications   Call for any new or worsening symptoms.     All new cardiac testing and lab results personally reviewed by me during this office visit and discussed with patient.    Patient counseled on lifestyle modification, diet, and exercise.    Follow up in: 3 months at [facility]     New Au MD  Cardiologist [facility]    Scribe Attestation: This note was scribed in the presence of Dr. Au by Fina Zepeda RN.    I have reviewed the history and physical and examined the patient and find no relevant changes in the history and physical exam, including heart and lung sounds. Pt not candidate for CABG. Will evaluate the LAD. RCA presumed to be infarcted and non viable.  I have reviewed with the patient and/or family the risks, benefits, and alternatives to the procedure.    Based on these findings I recommend left heart cath for definitive evaluation of coronary arteries.  Risks, benefits, expectations, and alternative treatments were discussed.  Questions appropriately answered.  Ezra Gonzalez agrees to proceed and verbalized understanding.       Mann Correa MD 1/12/2024 8:07 AM

## 2024-01-12 NOTE — TELEPHONE ENCOUNTER
Returned call to Ezra.   He wanted to review his medications. Reviewed med list with him and confirmed his medications/dosages.   He also wanted to know if he could go swimming and get in the hot tub at the gym. Let him know he should hold off until he has his follow up appt with TAMIKO. He v/u.

## 2024-01-12 NOTE — PRE SEDATION
Brief Pre-Op Note/Sedation Assessment      Ezra Gonzalez  1950  [id number]  8:10 AM    Planned Procedure: Cardiac Catheterization Procedure  Post Procedure Plan: Return to same level of care  Consent: I have discussed with the patient and/or the patient representative the indication, alternatives, and the possible risks and/or complications of the planned procedure and the anesthesia methods. The patient and/or patient representative appear to understand and agree to proceed.        Chief Complaint:   Chest Pain/Pressure  Dyspnea on Exertion  Dyspnea      Indications for Cath Procedure:  Presentation:  Worsening Angina and Stable Known CAD  2.  Anginal Classification within 2 weeks:  CCS III - Symptoms with everyday living activities, i.e., moderate limitation  3.  Angina Symptoms Assessment:  Typical Chest Pain  4.  Heart Failure Class within last 2 weeks:  No symptoms  5.  Cardiovascular Instability:  No    Prior Ischemic Workup/Eval:  Pre-Procedural Medications: Yes: Aspirin, Beta Blockers, and STATIN  2.   Stress Test Completed?  Yes:  Stress or Imaging Studies Performed (within ANY time period):   Type:  Stress Nuclear  Results:  Positive:  Myocardial Perfusion Defects (Nuclear) Extent of Ischemia:  Intermediate    Does Patient need surgery?  Cath Valve Surgery:  No    Pre-Procedure Medical History:  Vital Signs:  /69   Pulse 62   Resp 16   Ht 1.753 m (5' 9\")   Wt 64.4 kg (142 lb)   SpO2 100%   BMI 20.97 kg/m²     Allergies:  No Known Allergies  Medications:    Current Facility-Administered Medications   Medication Dose Route Frequency Provider Last Rate Last Admin    sodium chloride flush 0.9 % injection 5-40 mL  5-40 mL IntraVENous 2 times per day Mann Correa MD        sodium chloride flush 0.9 % injection 5-40 mL  5-40 mL IntraVENous PRN Mann Correa MD        0.9 % sodium chloride infusion   IntraVENous PRN Mann Correa MD           Past Medical History:    Past Medical History:    Diagnosis Date    CAD (coronary artery disease)     Glaucoma     Hypertension        Surgical History:    Past Surgical History:   Procedure Laterality Date    ARM SURGERY      CATARACT EXTRACTION      DIAGNOSTIC CARDIAC CATH LAB PROCEDURE               Pre-Sedation:  Pre-Sedation Documentation and Exam:  I have personally completed a history, physical exam & review of systems for this patient (see notes).    Prior History of Anesthesia Complications:   none    Modified Mallampati:  II (soft palate, uvula, fauces visible)    ASA Classification:  Class 2 - A normal healthy patient with mild systemic disease    Junaid Scale:  Activity:  2 - Able to move 4 extremities voluntarily on command  Respiration:  2 - Able to breathe deeply and cough freely  Circulation:  2 - BP+/- 20mmHg of normal  Consciousness:  2 - Fully awake  Oxygen Saturation (color):  2 - Able to maintain oxygen saturation >92% on room air    Sedation/Anesthesia Plan:  Guard the patient's safety and welfare.  Minimize physical discomfort and pain.  Minimize negative psychological responses to treatment by providing sedation and analgesia and maximize the potential amnesia.  Patient to meet pre-procedure discharge plan.    Medication Planned:  midazolam intravenously and fentanyl intravenously    Patient is an appropriate candidate for plan of sedation:   yes      Electronically signed by Mann Correa MD on 1/12/2024 at 8:10 AM

## 2024-01-12 NOTE — DISCHARGE INSTRUCTIONS
Radial Angiogram      Care of your puncture site:  Remove clear bandage 24 hours after the procedure.  May shower in 24 hours  Inspect the site daily and gently clean using soap and water.  Dry thoroughly and apply a Band-Aid.    Normal Observations:  Soreness or tenderness which may last one week.  Mild oozing from the incision site.  Possible bruising that could last 2 weeks.    Activity:  You may resume driving 24 hours following the procedure.  You may resume normal activity in 3 days or after the wound heals.  Avoid lifting more than 10 pounds for 3 days with affected arm.    Nutrition:  Regular diet.  Drink at least 8 to 10 glasses of decaffeinated, non-alcoholic fluid for the next 24 hours to flush the x-ray dye used for your angiogram out of your body.    Call your doctor immediately if your condition worsens, for any other concerns, for a follow-up appointment or if you experience any of the following:  Significant bleeding that does not stop after 10 minutes of applying firm pressure on the puncture site.  Increased swelling of the wrist.  Unusual pain, numbness, or tingling of the wrist/arm.   Any signs of infection such as: redness, yellow drainage at the site, swelling or pain.

## 2024-01-12 NOTE — PROGRESS NOTES
Educated patient and/or family on the importance of attending Cardiac Rehab post procedure. Educational flyer provided. Cardiology RN notified to place outpatient orders.

## 2024-01-12 NOTE — PROGRESS NOTES
PRE-PROCEDURE    DATE: 1/12/2024 ARRIVAL TO CATH LAB: 7:05 AM    ADMIT SOURCE: Outpatient    ID & ALLERGY BAND: On    CONSENT: Yes    NPO SINCE: Midnight    LABS/PREGNANCY TEST: N/A    PULSES:  Right Radial Artery 2+  Right DP 1+  Right PT 1+  Left DP 1+  Left PT 1+    IV SITE : Started in Left A/C.  with fluids infusing at kvo 7:05 AM     SURGERIES PLANNED: No    BLEEDING PROBLEMS: No    BLEEDING RISK: Low Risk <2%    COMPLIANCE: Yes      PATIENT HISTORY    CHIEF COMPLAINT: Dyspnea on Exertion    EKG:  Yes    Pre CATH Rhythm: Normal Sinus Rhythm    STRESS TEST PREFORMED:  Yes FINDINGS:  Stress Nuclear: Date:  12/26/2023  Result:  Positive: Unavailable     EF: 60%    CARDIAC CTA PREFORMED:  Yes     Most recent date:  7/03/2023     Results:    Unknown    AGATSTON CORONARY CALCIUM SCORE:   Assessed: Yes     Score: 1214    Date: 07/03/2023    ECHO: DATE:  Yes.  Most recent date: 06/15/2023      EF:  55    HYPERTENSION: Yes    DYSLIPIDEMIA: Yes    FAMILY HX OF CAD: No    PRIOR MI: No    PRIOR PCI: No    PRIOR CABG: No    CEREBRALVASCULAR DX: No    PERIPHERAL ARTERIAL DISEASE: Yes    CHRONIC LUNG DISEASE: No    TOBACCO: Cigarettes <10 a Day    DIABETIC: No    CARDIAC ARREST: No    DIALYSIS: No    FRAILTY SCORE: 3 MANAGING WELL (medical problems are well controlled, not regularly active beyond routine walking)

## 2024-01-12 NOTE — PROCEDURES
Patient:  Ezra Gonzalez   :   1950    PROCEDURAL SUMMARY  ~Consent:   Obtained written and verbal consent      Risks/benefits explained in detail  ~Procedure:    Left Heart Catheterization  ~Medications:    Procedural sedation with minimal conscious sedation  ~Complications:   None  ~Blood Loss:    <10cc  ~Specimens:    None obtained  ~Pre-sedation re-evaluation: Performed immediately prior to procedure.  ~Performing Physician:          Mann Correa MD    ~Assistants:       None    INDICATIONS:  Pre-Procedure Diagnosis:  New Onset Angina <= 2 months, Worsening Angina, and Suspected CAD    Post-Procedure Diagnosis: same    PROCEDURES PERFORMED:   Left heart catheterization with  PCI and FFR.    PROCEDURE DETAILS/TECHNIQUE:  Local anesthetic was given and access was obtained in the right Radial Artery using a micropuncture technique and a (5/6) Fr Slender Terumo Sheath was placed without difficulty. Catheters were advanced over a 0.35 wire under fluoroscopic guidance  At the end of the procedure a TR band device was used for hemostasis.     ANGIOGRAM/CORONARY ARTERIOGRAM:     Left main artery Bifurcates into the left anterior descending artery and left circumflex artery nml   Left anterior descending artery Gives rise to 3 diagonal arteries Mid 80%, distal 80%   Diagonals  nml   Left circumflex artery Non-dominant vessel that gives rise to 3 obtuse marginal arteries Mid 90%   Obtuse Marginals  Om3 diffuse 99%                   INTERVENTION:  Lesion 1, PCI of LAD:  FFR OF LAD 0.76, DFR 0.76  ~Guide catheter: Accessing and selectively catheterizing with the xb 3.5  ~Coronary wire: Traversing the lesion with a comet wire  ~Additional Equipment: Guideliner  ~Pre-dilation Balloon: 2.0x20 balloon  ~Drug Eluting Stent: 3.0x48 synergy, 2.5x16 synergy  ~Post-dilation Balloon: 3.25x12 NC  Results of Intervention   Pre - 80% stenosis, JAYCE 3 flow  Post - 0% stenosis, JAYCE 3 flow      MEDICATION AND PROCEDURAL

## 2024-01-12 NOTE — TELEPHONE ENCOUNTER
Pt states he had a call, and it dropped when he picked up the call.  If you can call him back he would appreciate it.

## 2024-01-12 NOTE — PROGRESS NOTES
Patient/family given discharge instructions. Patient/family verbalize understanding of discharge instructions, all questions addressed, copy given to patient/family. PIV removed. No complications noted. Pt transferred to vehicle via wheelchair to be discharged home with family.

## 2024-01-16 ENCOUNTER — TELEPHONE (OUTPATIENT)
Dept: CARDIOLOGY CLINIC | Age: 74
End: 2024-01-16

## 2024-01-16 NOTE — TELEPHONE ENCOUNTER
Pt had stents put in on 01/12 things are great, Pt wants to know if he can go to the  and get in the sauna.  Pt wants to go to the sauna today.  Please call to discuss.  Thank you

## 2024-01-19 ENCOUNTER — TELEPHONE (OUTPATIENT)
Dept: CARDIOLOGY CLINIC | Age: 74
End: 2024-01-19

## 2024-01-19 NOTE — PROGRESS NOTES
calcified  Cx-nml  OM- mid 99%  RCA-dom mid 100%   RPDA- L to R collaterals to LAD  LVEF- 50%  LVG- inferobasal akinesis  LVEDP- 2     Contrast: 55  Flouro Time: 2.2  Access: R radial a     Impression  ~Coronary Angiography w/ severe MVD  ~LVG with LVEF of 50% and inferior regional wall motion abnormalities.     Recommendation  ~Aggressive medical treatment and risk factor modification  ~1. Medications reviewed, no changes at this time.  2. Post cath IVF. Bedrest.  3. Patient with moderate to severe multi vessel disease.   4. Patient has been advised on the importance of regular exercise of at least 20-30 minutes daily alternating with aerobic and isometric activities.   5. Patient counseled about and offered assistance for smoking cessation   6. No indication for cardiac rehab  7. Follow up with Dr. Au as outpatient to discuss need for CABG.      He is asymptomatic and active. LAD disease is not critical. I believe that he would gain little benefit from revascularization at this time. If referring doc recommends CABG, patient will follow up with Dr. Bustamante later next week.      Stress test: 12/2023  Summary   Small-medium sized inferior partial reversibility defect of moderate   intensity consistent with ischemia and infarction in the territory of the   mid and distal LCx and/or RCA .      Recommendation   Consider PCI of marginal      ANGIOGRAM/CORONARY ARTERIOGRAM:   1/12/24  Left main artery Bifurcates into the left anterior descending artery and left circumflex artery nml   Left anterior descending artery Gives rise to 3 diagonal arteries Mid 80%, distal 80%   Diagonals   nml   Left circumflex artery Non-dominant vessel that gives rise to 3 obtuse marginal arteries Mid 90%   Obtuse Marginals   Om3 diffuse 99%                            INTERVENTION:  Lesion 1, PCI of LAD:  FFR OF LAD 0.76, DFR 0.76  ~Guide catheter: Accessing and selectively catheterizing with the xb 3.5  ~Coronary wire: Traversing the

## 2024-01-19 NOTE — TELEPHONE ENCOUNTER
Per WALLACE, A BP of 125/50 is not concerning for Ezra.      I called Ezra to notify.  No answer, voicemail left with these details.  Requested a return call if further assistance was needed..

## 2024-01-25 ENCOUNTER — OFFICE VISIT (OUTPATIENT)
Dept: CARDIOLOGY CLINIC | Age: 74
End: 2024-01-25
Payer: MEDICARE

## 2024-01-25 VITALS
HEART RATE: 74 BPM | BODY MASS INDEX: 21.52 KG/M2 | HEIGHT: 68 IN | DIASTOLIC BLOOD PRESSURE: 60 MMHG | OXYGEN SATURATION: 98 % | WEIGHT: 142 LBS | SYSTOLIC BLOOD PRESSURE: 130 MMHG

## 2024-01-25 DIAGNOSIS — E78.5 HYPERLIPIDEMIA, UNSPECIFIED HYPERLIPIDEMIA TYPE: ICD-10-CM

## 2024-01-25 DIAGNOSIS — Z09 HOSPITAL DISCHARGE FOLLOW-UP: ICD-10-CM

## 2024-01-25 DIAGNOSIS — I25.10 CORONARY ARTERY DISEASE INVOLVING NATIVE HEART WITHOUT ANGINA PECTORIS, UNSPECIFIED VESSEL OR LESION TYPE: Primary | ICD-10-CM

## 2024-01-25 DIAGNOSIS — F17.200 SMOKER: ICD-10-CM

## 2024-01-25 PROCEDURE — G8420 CALC BMI NORM PARAMETERS: HCPCS | Performed by: NURSE PRACTITIONER

## 2024-01-25 PROCEDURE — 4004F PT TOBACCO SCREEN RCVD TLK: CPT | Performed by: NURSE PRACTITIONER

## 2024-01-25 PROCEDURE — G8484 FLU IMMUNIZE NO ADMIN: HCPCS | Performed by: NURSE PRACTITIONER

## 2024-01-25 PROCEDURE — 3017F COLORECTAL CA SCREEN DOC REV: CPT | Performed by: NURSE PRACTITIONER

## 2024-01-25 PROCEDURE — 1123F ACP DISCUSS/DSCN MKR DOCD: CPT | Performed by: NURSE PRACTITIONER

## 2024-01-25 PROCEDURE — 1111F DSCHRG MED/CURRENT MED MERGE: CPT | Performed by: NURSE PRACTITIONER

## 2024-01-25 PROCEDURE — G8427 DOCREV CUR MEDS BY ELIG CLIN: HCPCS | Performed by: NURSE PRACTITIONER

## 2024-01-25 PROCEDURE — 99214 OFFICE O/P EST MOD 30 MIN: CPT | Performed by: NURSE PRACTITIONER

## 2024-01-25 NOTE — PATIENT INSTRUCTIONS
Continue current medications     Complete fasting blood work mid March. Fast for 12 hours prior, may have black coffee or water.     Cardiac rehab referral- they will call you     Follow up as planned with Dr. Au     Eat less of these foods  Potato chips, french fries, and other “junk” foods  Vegetables cooked in butter, cheese, or cream sauces  Fried foods  Full fat dairy products   Calix, sausage, and organ meats (like liver)  Egg yolks  Cheesecake, pastries, doughnuts, ice cream  Butter and margarine  Sweetened drinks   Tropical oils such as coconut and palm oil  Mediterranean-like diet    Eat more of these foods  Whole-grain breads and pasta, brown rice, whole-grain bagels  Fresh, frozen, baked, or steamed fruits and vegetables  Steamed, baked, or fresh foods  Fat-free dairy products   Fish, skinless poultry, lean cuts of meat (with fat trimmed away), soy products  Egg whites, egg substitutes  Dessert examples: Fernando food cake, fig bars, animal crackers, jonathan crackers, air-popped popcorn, low-fat frozen desserts (yogurt, sherbet, ice milk)  Olive oil or canola oil (in small amounts)    The American Heart Association offers these guidelines for how much fat to include in a heart-healthy diet:  Type of fat Recommendation   Saturated fat Less than 6% of total daily calories.* If you're eating 2,000 calories a day, that's about 11 to 13 grams.   Trans fat Avoid       Exercise Recommendations for Adults  Get at least 150 minutes per week of moderate-intensity aerobic activity or 75 minutes per week of vigorous aerobic activity, or a combination of both, preferably spread throughout the week.  Add moderate- to high-intensity muscle-strengthening activity (such as resistance or weights) on at least 2 days per week.  Spend less time sitting. Even light-intensity activity can offset some of the risks of being sedentary.  Gain even more benefits by being active at least 300 minutes (5 hours) per week.  Increase amount

## 2024-01-30 ENCOUNTER — TELEPHONE (OUTPATIENT)
Dept: CARDIOLOGY CLINIC | Age: 74
End: 2024-01-30

## 2024-01-30 NOTE — TELEPHONE ENCOUNTER
Pt called stating they have not heard anything when they can start cardiac rehab.    Pls advise thank you

## 2024-01-30 NOTE — TELEPHONE ENCOUNTER
From OV note dated 1/25/24 with TAMIKO.      Plan:      Continue current medications   Complete fasting LFTs/ lipids mid March d/t increased statin   Cardiac rehab referral  Follow up as planned 4/1 with Dr. Au       RECOMMENDATION:  ~Aggressive medical treatment and risk factor modification  ~1. Post cath IVF. Bedrest.   2. Recommend beta blocker, high potency statin, aspirin and plavix for 6-12 months. No ace/arb required given normal LVEF.  3. Referral to outpatient cardiac rehab phase II will be deferred until patient follow-up in office and then determine patient safety and appropriateness to proceed  4. Patient has been advised on the importance of regular exercise of at least 20-30 minutes daily.   5. Patient counseled about and offered assistance for smoking cessation   6. Follow up in 2 weeks with cardiology       Would you like for the pt to do cardiac rehab?  Please advise. Thanks

## 2024-02-05 ENCOUNTER — PREP FOR PROCEDURE (OUTPATIENT)
Dept: VASCULAR SURGERY | Age: 74
End: 2024-02-05

## 2024-02-05 ENCOUNTER — TELEPHONE (OUTPATIENT)
Dept: CARDIOLOGY CLINIC | Age: 74
End: 2024-02-05

## 2024-02-05 ENCOUNTER — HOSPITAL ENCOUNTER (OUTPATIENT)
Dept: CARDIAC CATH/INVASIVE PROCEDURES | Age: 74
Discharge: HOME | End: 2024-02-05
Attending: SURGERY | Admitting: SURGERY
Payer: MEDICARE

## 2024-02-05 ENCOUNTER — TELEPHONE (OUTPATIENT)
Dept: VASCULAR SURGERY | Age: 74
End: 2024-02-05

## 2024-02-05 VITALS
BODY MASS INDEX: 21.48 KG/M2 | RESPIRATION RATE: 13 BRPM | HEIGHT: 69 IN | DIASTOLIC BLOOD PRESSURE: 60 MMHG | SYSTOLIC BLOOD PRESSURE: 154 MMHG | HEART RATE: 52 BPM | OXYGEN SATURATION: 100 % | WEIGHT: 145 LBS

## 2024-02-05 DIAGNOSIS — I70.213 ATHEROSCLEROSIS OF NATIVE ARTERY OF BOTH LOWER EXTREMITIES WITH INTERMITTENT CLAUDICATION (HCC): ICD-10-CM

## 2024-02-05 DIAGNOSIS — I70.213 ATHEROSCLEROSIS OF NATIVE ARTERIES OF EXTREMITIES WITH INTERMITTENT CLAUDICATION, BILATERAL LEGS (HCC): Primary | ICD-10-CM

## 2024-02-05 DIAGNOSIS — Z01.818 PREOP EXAMINATION: ICD-10-CM

## 2024-02-05 DIAGNOSIS — I70.213 ATHEROSCLEROSIS OF NATIVE ARTERY OF BOTH LOWER EXTREMITIES WITH INTERMITTENT CLAUDICATION (HCC): Primary | ICD-10-CM

## 2024-02-05 LAB
ANION GAP SERPL CALCULATED.3IONS-SCNC: 9 MMOL/L (ref 3–16)
BUN SERPL-MCNC: 12 MG/DL (ref 7–20)
CALCIUM SERPL-MCNC: 8.9 MG/DL (ref 8.3–10.6)
CHLORIDE SERPL-SCNC: 104 MMOL/L (ref 99–110)
CO2 SERPL-SCNC: 26 MMOL/L (ref 21–32)
CREAT SERPL-MCNC: 0.5 MG/DL (ref 0.8–1.3)
DEPRECATED RDW RBC AUTO: 20.9 % (ref 12.4–15.4)
GFR SERPLBLD CREATININE-BSD FMLA CKD-EPI: >60 ML/MIN/{1.73_M2}
GLUCOSE SERPL-MCNC: 112 MG/DL (ref 70–99)
HCT VFR BLD AUTO: 35.8 % (ref 40.5–52.5)
HGB BLD-MCNC: 11.9 G/DL (ref 13.5–17.5)
MCH RBC QN AUTO: 29 PG (ref 26–34)
MCHC RBC AUTO-ENTMCNC: 33.4 G/DL (ref 31–36)
MCV RBC AUTO: 86.9 FL (ref 80–100)
PLATELET # BLD AUTO: 262 K/UL (ref 135–450)
PMV BLD AUTO: 8 FL (ref 5–10.5)
POTASSIUM SERPL-SCNC: 4.7 MMOL/L (ref 3.5–5.1)
RBC # BLD AUTO: 4.12 M/UL (ref 4.2–5.9)
SODIUM SERPL-SCNC: 139 MMOL/L (ref 136–145)
WBC # BLD AUTO: 3.8 K/UL (ref 4–11)

## 2024-02-05 PROCEDURE — 75625 CONTRAST EXAM ABDOMINL AORTA: CPT | Performed by: SURGERY

## 2024-02-05 PROCEDURE — 99152 MOD SED SAME PHYS/QHP 5/>YRS: CPT

## 2024-02-05 PROCEDURE — 85027 COMPLETE CBC AUTOMATED: CPT

## 2024-02-05 PROCEDURE — 2500000003 HC RX 250 WO HCPCS

## 2024-02-05 PROCEDURE — 2709999900 HC NON-CHARGEABLE SUPPLY

## 2024-02-05 PROCEDURE — 6360000002 HC RX W HCPCS

## 2024-02-05 PROCEDURE — 36246 INS CATH ABD/L-EXT ART 2ND: CPT

## 2024-02-05 PROCEDURE — 99152 MOD SED SAME PHYS/QHP 5/>YRS: CPT | Performed by: SURGERY

## 2024-02-05 PROCEDURE — 80048 BASIC METABOLIC PNL TOTAL CA: CPT

## 2024-02-05 PROCEDURE — C1894 INTRO/SHEATH, NON-LASER: HCPCS

## 2024-02-05 PROCEDURE — 75716 ARTERY X-RAYS ARMS/LEGS: CPT | Performed by: SURGERY

## 2024-02-05 PROCEDURE — 75716 ARTERY X-RAYS ARMS/LEGS: CPT

## 2024-02-05 PROCEDURE — C1887 CATHETER, GUIDING: HCPCS

## 2024-02-05 PROCEDURE — C1760 CLOSURE DEV, VASC: HCPCS

## 2024-02-05 PROCEDURE — 36247 INS CATH ABD/L-EXT ART 3RD: CPT | Performed by: SURGERY

## 2024-02-05 PROCEDURE — 75625 CONTRAST EXAM ABDOMINL AORTA: CPT

## 2024-02-05 PROCEDURE — C1769 GUIDE WIRE: HCPCS

## 2024-02-05 PROCEDURE — 36415 COLL VENOUS BLD VENIPUNCTURE: CPT

## 2024-02-05 PROCEDURE — G0269 OCCLUSIVE DEVICE IN VEIN ART: HCPCS

## 2024-02-05 PROCEDURE — 6360000004 HC RX CONTRAST MEDICATION: Performed by: SURGERY

## 2024-02-05 RX ORDER — SODIUM CHLORIDE 0.9 % (FLUSH) 0.9 %
5-40 SYRINGE (ML) INJECTION PRN
Status: CANCELLED | OUTPATIENT
Start: 2024-02-05

## 2024-02-05 RX ORDER — SODIUM CHLORIDE 0.9 % (FLUSH) 0.9 %
5-40 SYRINGE (ML) INJECTION EVERY 12 HOURS SCHEDULED
Status: DISCONTINUED | OUTPATIENT
Start: 2024-02-05 | End: 2024-02-05 | Stop reason: HOSPADM

## 2024-02-05 RX ORDER — SODIUM CHLORIDE 9 MG/ML
INJECTION, SOLUTION INTRAVENOUS CONTINUOUS
Status: CANCELLED | OUTPATIENT
Start: 2024-02-05

## 2024-02-05 RX ORDER — SODIUM CHLORIDE 0.9 % (FLUSH) 0.9 %
5-40 SYRINGE (ML) INJECTION EVERY 12 HOURS SCHEDULED
Status: CANCELLED | OUTPATIENT
Start: 2024-02-05

## 2024-02-05 RX ORDER — SODIUM CHLORIDE 0.9 % (FLUSH) 0.9 %
5-40 SYRINGE (ML) INJECTION PRN
Status: DISCONTINUED | OUTPATIENT
Start: 2024-02-05 | End: 2024-02-05 | Stop reason: HOSPADM

## 2024-02-05 RX ORDER — SODIUM CHLORIDE 9 MG/ML
INJECTION, SOLUTION INTRAVENOUS PRN
Status: CANCELLED | OUTPATIENT
Start: 2024-02-05

## 2024-02-05 RX ORDER — SODIUM CHLORIDE 9 MG/ML
INJECTION, SOLUTION INTRAVENOUS PRN
Status: DISCONTINUED | OUTPATIENT
Start: 2024-02-05 | End: 2024-02-05 | Stop reason: HOSPADM

## 2024-02-05 RX ADMIN — IOPAMIDOL 60 ML: 755 INJECTION, SOLUTION INTRAVENOUS at 08:00

## 2024-02-05 NOTE — DISCHARGE INSTRUCTIONS
PERIPHERAL ANGIOGRAM    Care of your puncture site:  Remove bandage 24 hours after the procedure.  May shower in 24 hours but do not sit in a bathtub/pool of water for 5 days or until the wound is healed.  Inspect the site daily and gently clean using soap and water while standing in the shower.  Dry thoroughly and apply a Band-Aid that covers the entire site. Do not apply powder or lotion.    Normal Observations:  Soreness or tenderness which may last one week.  Mild oozing from the incision site.  Possible bruising that could last 2 weeks.    Activity:  You may resume driving 24 hours following the procedure.  You may resume normal activity in 5 days or after the wound heals.  Avoid lifting more than 10 pounds for 5 days or until the wound heals.  Avoid strenuous exercise or activity for 1 week.    Nutrition:  Regular diet   Drink at least 8 to 10 glasses of decaffeinated, non-alcoholic fluid for the next 24 hours to flush the x-ray dye used for your angiogram out of your body.    Call your doctor immediately if your condition worsens, for any other concerns, for a follow-up appointment or if you experience any of the following:  Significant bleeding that does not stop after 10 minutes of applying firm pressure on the puncture site.  Increased swelling on the groin or leg.  Unusual pain, numbness, or tingling of the groin or down the leg.  Any signs of infection such as: redness, yellow drainage at the site, swelling or pain.

## 2024-02-05 NOTE — TELEPHONE ENCOUNTER
CARDIAC CLEARANCE     What type of procedure are you having?  Left leg femeral popliteal bypass kenny    Which physician is performing your procedure?  Dr Mccann    When is your procedure scheduled for?  03/18/24    Where are you having this procedure?  [facility]    Are you taking Blood Thinners? yes   If so what? (Name/dose/frequesncy)  Plavix    Does the surgeon want you to stop your blood thinner?  If so for how long?  1 week    Phone Number and Contact Name for Physicians off  Fax number to send information:  [phone] can do fax or place in Epic

## 2024-02-05 NOTE — PROGRESS NOTES
Pt. Received from procedure room in stable condition. Pt alert and oriented, RR easy and unlabored, vss. Physician at bedside updating pt. and family at plan of care. Pt. Provided with snack/drink. No further needs at this time.

## 2024-02-05 NOTE — OP NOTE
Procedure Note 2/5/2024    Ezra Gonzalez  YOB: 1950  [id number]    Pre-procedure Diagnosis:   Bilateral lower extremity Gonzales 3 peripheral arterial disease    Post-procedure Diagnosis: Same    Procedure: 1) Ultrasound guided access to the Right   common femoral artery.  2)  Abdominal aortogram with Bilateral lower extremity runoffs  3)  2nd order LLE angiogram for improved distal visualization        Surgeons/Assistants: Noman Mccann II, MD, MD FACS    Estimated Blood Loss: Minimal    Complications: none    Specimens: none    Indications and consent:  This is a 73 y.o. year old male with Bilateral lower extremity Gonzales category 3 peripheral arterial disease.   Patient with left greater than right lower extremity claudication and long history of tobacco abuse.  CT scan in July suggested bilateral SFA and tibial disease.  Treatment options were discussed.   Angiography was recommended to evaluate and possibly intervene to improve symptoms.  Risks, benefits, and alternatives were discussed prior to the procedure.  Questions from the patient and family were answered and appropriate signed informed consent was obtained.    Procedure: After witnessed informed consent was obtained patient brought to the Cath Lab were under my supervision Versed and fentanyl were administered for intravenous sedation.  Pulse oximetry, heart rate, and blood pressure were monitored by an independent trained observer that was present.  I spent 22 minutes of face-to-face sedation time with the patient.  The Right groin were carefully prepped and draped.  1% lidocaine was infiltrated locally into the area overlying the common femoral artery.  Ultrasound was used to identify the common femoral artery which was noted to be patent and an image was saved to the patient's permanent medical record.  A 4 Swedish micropuncture needle was used to access the artery under direct ultrasound visualization.  This was then exchanged

## 2024-02-05 NOTE — PROGRESS NOTES
PRE-PROCEDURE    DATE: 2/5/2024 ARRIVAL TO CATH LAB: 7:07 AM    ADMIT SOURCE: Outpatient    ID & ALLERGY BAND: On    CONSENT: Yes    NPO SINCE: Midnight    PULSES: Right DP 3+  Right PT Present with Doppler  Left DP Absent  Left PT Absent    IV SITE : Started in left arm.  with fluids infusing at kvo 7:07 AM

## 2024-02-05 NOTE — H&P
Consultation/History & Physical    Date of Admission:  2/5/2024  Date of Consultation:  2/5/2024    PCP:  Jazmin Noyola (Inactive)     Chief Complaint: BLE claudication    History of Present Illness:  Ezra Gonzalez is a 73 y.o. male who presents with BLE claudication.  Presents for peripheral angiogram. Patient states left worse than right    PMH:   has a past medical history of CAD (coronary artery disease), Glaucoma, and Hypertension.      PSH:   has a past surgical history that includes Diagnostic Cardiac Cath Lab Procedure; Arm Surgery; and Cataract extraction.    Allergies:  No Known Allergies     Home Meds:    Prior to Admission medications    Medication Sig Start Date End Date Taking? Authorizing Provider   rosuvastatin (CRESTOR) 20 MG tablet Take 1 tablet by mouth daily 1/12/24   Mann Correa MD   aspirin 81 MG EC tablet Take 1 tablet by mouth daily 1/12/24   Mann Correa MD   clopidogrel (PLAVIX) 75 MG tablet Take 1 tablet by mouth daily 10/26/23   New Au MD   diclofenac sodium (VOLTAREN) 1 % GEL  7/7/23   Lupis Peck MD   mirtazapine (REMERON) 7.5 MG tablet Take 1 tablet by mouth nightly at bedtime.  Patient not taking: Reported on 1/25/2024 7/7/23   Lupis Peck MD   metoprolol succinate (TOPROL XL) 25 MG extended release tablet Take 1 tablet by mouth daily 7/17/23   New Au MD   Cholecalciferol 50 MCG (2000 UT) TABS Take 1 tablet by mouth daily 5/16/23   Lupis Peck MD   fluticasone (FLONASE) 50 MCG/ACT nasal spray  4/10/23   Lupis Peck MD        Hospital Meds:    Current Facility-Administered Medications   Medication Dose Route Frequency Provider Last Rate Last Admin    sodium chloride flush 0.9 % injection 5-40 mL  5-40 mL IntraVENous 2 times per day Noman Mccann II, MD        sodium chloride flush 0.9 % injection 5-40 mL  5-40 mL IntraVENous PRN Noman Mccann II, MD        0.9 % sodium chloride infusion   IntraVENous

## 2024-02-05 NOTE — TELEPHONE ENCOUNTER
Called and requested cardiac clearance from Dr Correa for upcoming Left Femoral to Popliteal bypass graft 3/18.

## 2024-02-06 ENCOUNTER — TELEPHONE (OUTPATIENT)
Dept: VASCULAR SURGERY | Age: 74
End: 2024-02-06

## 2024-02-07 ENCOUNTER — TELEPHONE (OUTPATIENT)
Dept: VASCULAR SURGERY | Age: 74
End: 2024-02-07

## 2024-02-07 NOTE — TELEPHONE ENCOUNTER
MrDebbie Gonzalez called asking if he should still be having pain since procedure and her also like to know when he can resume normal activity.

## 2024-02-07 NOTE — TELEPHONE ENCOUNTER
Patient called to see if should be having pain after angiogram. I told the patient pain since needs bypass so will not have gone away. May be tender when he went into the groin to access the artery. May be bruised. He can do activity as discussed per Dr Mccann as tolerated. Miranda

## 2024-02-12 ENCOUNTER — TELEPHONE (OUTPATIENT)
Dept: CARDIAC REHAB | Age: 74
End: 2024-02-12

## 2024-02-12 PROCEDURE — 93798 PHYS/QHP OP CAR RHAB W/ECG: CPT

## 2024-02-13 ENCOUNTER — HOSPITAL ENCOUNTER (OUTPATIENT)
Dept: CARDIAC REHAB | Age: 74
Setting detail: THERAPIES SERIES
Discharge: HOME | End: 2024-02-13
Payer: MEDICARE

## 2024-02-13 VITALS
WEIGHT: 143.8 LBS | HEART RATE: 70 BPM | SYSTOLIC BLOOD PRESSURE: 146 MMHG | HEIGHT: 69 IN | DIASTOLIC BLOOD PRESSURE: 60 MMHG | BODY MASS INDEX: 21.3 KG/M2 | RESPIRATION RATE: 20 BRPM

## 2024-02-13 PROCEDURE — 93798 PHYS/QHP OP CAR RHAB W/ECG: CPT

## 2024-02-13 NOTE — CARDIO/PULMONARY
[facility] Cardiac Rehabilitation Initial Evaluation    Ezra Gonzalez       1950     [id number]    Share medical information:  pt doesn't have anyone he can rely on to call but will get back with us about a contact.    Cardiac History    PTCA Stent2024    Physical Assessment     General Appearance   Height:  175.3 cm (5' 9\")  Weight:  65.2 kg (143 lb 12.8 oz)   BMI:  21.3  Skin color:         Cardiovascular Assessment  BP Sitting:  (!) 146/60  Sittin/64  Standin/68 (lt)  Heart rate:   70     Heart sounds: Within Defined Limits   S1, S2    Respiratory Assessment  Resp rate: 20                  SpO2:     Quality/Effort:      Sleep Apnea:  No  CPAP  No  Oxygen  No     Sleeping Habits:  sleeps well    Edema:  No      Orthopedic/Exercise Limitations:  Yes claudication pain bilaterally    Pain:   Do you have pain?:  yes - rates a 9 with extended walking.         Fall Risk Assessment     History of falling with or without injury: No  Use of ambulatory aid: No  Difficulty walking/impaired gait: No  Numbness in feet: Yes  Vision changes: No  Dizziness: No  Shortness of breath: No  Current medications include but not limited to: Anticoagulant, ACE, ARB, Beta  Blocker  Other fall risk : No  Outpatient fall risk intervention strategies: Fall risk education provided    Abuse / Neglect  Physical/behavioral signs of abuse/neglect   No    Do you feel safe at home   Yes    Advanced Directives  Patient has Advanced Directives:  Yes  Patient given Advanced Directive pack:  No    Vaccinations  Influenza (annual):  No  Pneumonia:  No  Explained insurance info.  To start ph 2 on wed. 24 at 3:00pm  after that he will come to the 1:pm class on MWF    ALICIA FIGUEREDO RN  2024

## 2024-02-14 ENCOUNTER — HOSPITAL ENCOUNTER (OUTPATIENT)
Dept: CARDIAC REHAB | Age: 74
Setting detail: THERAPIES SERIES
Discharge: HOME | End: 2024-02-14
Payer: MEDICARE

## 2024-02-14 PROCEDURE — 93798 PHYS/QHP OP CAR RHAB W/ECG: CPT

## 2024-02-14 PROCEDURE — 93797 PHYS/QHP OP CAR RHAB WO ECG: CPT

## 2024-02-14 NOTE — CARDIO/PULMONARY
-General Diet Recommendations: low fat, low cholesterol, substitute healthy fats, such as olive oil, canola oil, grapeseed oil for saturated fats like butter, margarine, and shortening, minimize processed foods, reduce sodium intake, and increase fiber intake.    Individualized Treament Goals:  1. Opt for 1% milk over 2% milk d/t drinking multiple cups per day  2. Opt for sodium free seasonings over salt  3. Opt for low sodium food items     Comprehension verified using teachback method.      MONITORING & EVALUATION  -Receptiveness to education/goals: Agreeable.  -Evaluation of education: Indicates understanding.  -Readiness to change: action - ready to set action plan and implement.  -Expected compliance: fair.    Total time involved in direct patient education: 60 minutes for initial MNT visit    Return Appointment:  [x] Your treatments are completed and no follow-up treatments are scheduled at this time.       Electronically signed by Gwen Myles MS, RD, LD on 2/14/2024 at 1:12 PM

## 2024-02-16 ENCOUNTER — HOSPITAL ENCOUNTER (OUTPATIENT)
Dept: CARDIAC REHAB | Age: 74
Setting detail: THERAPIES SERIES
Discharge: HOME | End: 2024-02-16
Payer: MEDICARE

## 2024-02-16 PROCEDURE — 93798 PHYS/QHP OP CAR RHAB W/ECG: CPT

## 2024-02-19 ENCOUNTER — HOSPITAL ENCOUNTER (OUTPATIENT)
Dept: CARDIAC REHAB | Age: 74
Setting detail: THERAPIES SERIES
Discharge: HOME | End: 2024-02-19
Payer: MEDICARE

## 2024-02-19 ENCOUNTER — TELEPHONE (OUTPATIENT)
Dept: CARDIOLOGY CLINIC | Age: 74
End: 2024-02-19

## 2024-02-19 PROCEDURE — 93798 PHYS/QHP OP CAR RHAB W/ECG: CPT

## 2024-02-19 NOTE — TELEPHONE ENCOUNTER
Spoke to Pt, he stated he has been feeling great other than the severe leg cramping on 02/17. I asked Pt about water intake and he stated he drinks about a gallon a day. Please advise, just started rosuvastatin last month.

## 2024-02-19 NOTE — TELEPHONE ENCOUNTER
Pt is feeling better, doing his rehab, his bp is good, his hr is 74, he is getting feeling back in his feet, but Saturday he woke up with terrible leg cramping, his pain was over the chart, he tried to walk it off, and finally got the cramping stopped by soaking in a hot tub of water.      He is in rehab from 1:00 to 2:30, he will call back if you leave a message.    Please call to discuss.

## 2024-02-20 ENCOUNTER — TELEPHONE (OUTPATIENT)
Dept: VASCULAR SURGERY | Age: 74
End: 2024-02-20

## 2024-02-20 NOTE — TELEPHONE ENCOUNTER
Patient has been undergoing a formal therapy program and has noted improvement in his legs.  Will hold on bypass at this time.  I will plan to see him in the office in 6 weeks

## 2024-02-21 ENCOUNTER — HOSPITAL ENCOUNTER (OUTPATIENT)
Dept: CARDIAC REHAB | Age: 74
Setting detail: THERAPIES SERIES
Discharge: HOME | End: 2024-02-21
Payer: MEDICARE

## 2024-02-21 PROCEDURE — 93798 PHYS/QHP OP CAR RHAB W/ECG: CPT

## 2024-02-22 NOTE — PROGRESS NOTES
CTA Abd Aorta with Runoff 7/3/23  IMPRESSION:  Diffuse atherosclerotic disease of the abdominal aorta with no aneurysm or  significant narrowing.   On the right, diffuse disease with moderate multifocal areas of narrowing in  the proximal to mid SFA and mid popliteal artery.  2 vessel runoff with  chronic occlusion of the posterior tibial artery.   On the left, moderate areas of narrowing in the mid SFA in suspected  three-vessel runoff on the left however posterior tibial artery is small and  difficult to evaluate but does appear to be patent to the level of the ankle.   Milder atherosclerotic disease as above   13 mm hypodense lesion within the liver which appears to represent  hemangioma.  Correlation with any outside imaging is recommended.  If none is  available, CT liver mass protocol could be performed for further evaluation.     CTA Cardiac 7/3/23  IMPRESSION:  1. Total calcium score is 1214, placing the patient at the 80th percentile.  2. Left main coronary artery: Widely patent left main coronary artery.  3. LAD: 50-69% stenosis noted in the proximal to mid LAD.  Distal LAD is  patent.  4. Tiny ramus intermedius coronary artery, this is widely patent.  5. Left circumflex coronary artery: Overall less than 50% stenosis in the  proximal left circumflex coronary artery, with a 3 mm short segment 50-69%  stenosis in the mid left circumflex coronary artery.  6. RCA: Greater than 70% narrowing noted in the proximal to mid right  coronary artery.  Short-segment less than 50% narrowing in the distal RCA.  7. Small patent PDA.  8. Estimated left ventricle ejection fraction 64%.     RECOMMENDATIONS:  CAD-RADS 4: 70-99% stenosis or greater than 50% stenosis at the left  main-consider invasive coronary angiography or at least functional assessment  (in the absence of left main disease or three-vessel disease).  Consider  symptom-guided anti-ischemic and preventive pharmacotherapy as well as risk  factor modification

## 2024-02-23 ENCOUNTER — HOSPITAL ENCOUNTER (OUTPATIENT)
Dept: CARDIAC REHAB | Age: 74
Setting detail: THERAPIES SERIES
Discharge: HOME | End: 2024-02-23
Payer: MEDICARE

## 2024-02-23 PROCEDURE — 93798 PHYS/QHP OP CAR RHAB W/ECG: CPT

## 2024-02-26 ENCOUNTER — HOSPITAL ENCOUNTER (OUTPATIENT)
Dept: CARDIAC REHAB | Age: 74
Setting detail: THERAPIES SERIES
Discharge: HOME | End: 2024-02-26
Payer: MEDICARE

## 2024-02-26 PROCEDURE — 93798 PHYS/QHP OP CAR RHAB W/ECG: CPT

## 2024-02-28 ENCOUNTER — HOSPITAL ENCOUNTER (OUTPATIENT)
Dept: CARDIAC REHAB | Age: 74
Setting detail: THERAPIES SERIES
Discharge: HOME | End: 2024-02-28
Payer: MEDICARE

## 2024-02-28 PROCEDURE — 93798 PHYS/QHP OP CAR RHAB W/ECG: CPT

## 2024-03-01 ENCOUNTER — HOSPITAL ENCOUNTER (OUTPATIENT)
Dept: CARDIAC REHAB | Age: 74
Setting detail: THERAPIES SERIES
Discharge: HOME | End: 2024-03-01
Payer: MEDICARE

## 2024-03-01 PROCEDURE — 93798 PHYS/QHP OP CAR RHAB W/ECG: CPT

## 2024-03-04 ENCOUNTER — HOSPITAL ENCOUNTER (OUTPATIENT)
Dept: CARDIAC REHAB | Age: 74
Setting detail: THERAPIES SERIES
Discharge: HOME | End: 2024-03-04
Payer: MEDICARE

## 2024-03-04 NOTE — CARDIO/PULMONARY
Patient came into cardiac rehab c/o having an upper respiratory issue over the weekend, sent patient home today.

## 2024-03-05 ENCOUNTER — OFFICE VISIT (OUTPATIENT)
Dept: CARDIOLOGY CLINIC | Age: 74
End: 2024-03-05
Payer: MEDICARE

## 2024-03-05 VITALS
OXYGEN SATURATION: 99 % | WEIGHT: 142.5 LBS | DIASTOLIC BLOOD PRESSURE: 60 MMHG | BODY MASS INDEX: 21.11 KG/M2 | HEART RATE: 89 BPM | HEIGHT: 69 IN | SYSTOLIC BLOOD PRESSURE: 122 MMHG

## 2024-03-05 DIAGNOSIS — I73.9 CLAUDICATION (HCC): ICD-10-CM

## 2024-03-05 DIAGNOSIS — I70.212 ATHEROSCLEROSIS OF NATIVE ARTERIES OF EXTREMITIES WITH INTERMITTENT CLAUDICATION, LEFT LEG (HCC): ICD-10-CM

## 2024-03-05 DIAGNOSIS — I25.10 CAD IN NATIVE ARTERY: Primary | ICD-10-CM

## 2024-03-05 DIAGNOSIS — R42 DIZZINESS: ICD-10-CM

## 2024-03-05 DIAGNOSIS — F17.200 SMOKER: ICD-10-CM

## 2024-03-05 PROCEDURE — 99214 OFFICE O/P EST MOD 30 MIN: CPT | Performed by: INTERNAL MEDICINE

## 2024-03-05 PROCEDURE — G8420 CALC BMI NORM PARAMETERS: HCPCS | Performed by: INTERNAL MEDICINE

## 2024-03-05 PROCEDURE — 3017F COLORECTAL CA SCREEN DOC REV: CPT | Performed by: INTERNAL MEDICINE

## 2024-03-05 PROCEDURE — G8427 DOCREV CUR MEDS BY ELIG CLIN: HCPCS | Performed by: INTERNAL MEDICINE

## 2024-03-05 PROCEDURE — G8484 FLU IMMUNIZE NO ADMIN: HCPCS | Performed by: INTERNAL MEDICINE

## 2024-03-05 PROCEDURE — 1123F ACP DISCUSS/DSCN MKR DOCD: CPT | Performed by: INTERNAL MEDICINE

## 2024-03-05 PROCEDURE — 4004F PT TOBACCO SCREEN RCVD TLK: CPT | Performed by: INTERNAL MEDICINE

## 2024-03-05 NOTE — PATIENT INSTRUCTIONS
Monitor feet for any kind of skin breakdown due to decrease in sensation     Reach out to PCP in regard to smoking cessation options    Follow up with Dr. Mccann with any questions/concerns in regard to legs/feet    Continue current medications    Follow up with Dr. Au in 6 months

## 2024-03-06 ENCOUNTER — HOSPITAL ENCOUNTER (OUTPATIENT)
Dept: CARDIAC REHAB | Age: 74
Setting detail: THERAPIES SERIES
End: 2024-03-06
Payer: MEDICARE

## 2024-03-08 ENCOUNTER — TELEPHONE (OUTPATIENT)
Dept: CARDIAC REHAB | Age: 74
End: 2024-03-08

## 2024-03-08 ENCOUNTER — HOSPITAL ENCOUNTER (OUTPATIENT)
Dept: CARDIAC REHAB | Age: 74
Setting detail: THERAPIES SERIES
End: 2024-03-08
Payer: MEDICARE

## 2024-03-09 NOTE — PROGRESS NOTES
claudication, left leg (HCC)    Smoker    Claudication (HCC)    CAD in native artery    Anginal equivalent    Atherosclerosis of native arteries of extremities with intermittent claudication, bilateral legs (HCC)    Atherosclerosis of native artery of both lower extremities with intermittent claudication (HCC)       Plan:  73-year-old male with known peripheral vascular disease is noted functional improvement in his left leg with decreased discomfort after undergoing formal exercise rehab.  He will continue to focus on this with the addition of Pletal.  I will see him back again in 3 months to monitor his progress    Thank you for allowing me to participate in the care of this individual.  Please do not hesitate to contact me with any questions.    Noman Mccann M.D., FACS.  3/9/2024  12:07 PM

## 2024-03-11 ENCOUNTER — HOSPITAL ENCOUNTER (OUTPATIENT)
Dept: CARDIAC REHAB | Age: 74
Setting detail: THERAPIES SERIES
Discharge: HOME | End: 2024-03-11
Payer: MEDICARE

## 2024-03-11 PROCEDURE — 93798 PHYS/QHP OP CAR RHAB W/ECG: CPT

## 2024-03-12 ENCOUNTER — OFFICE VISIT (OUTPATIENT)
Dept: VASCULAR SURGERY | Age: 74
End: 2024-03-12
Payer: MEDICARE

## 2024-03-12 VITALS
BODY MASS INDEX: 20.59 KG/M2 | SYSTOLIC BLOOD PRESSURE: 122 MMHG | HEIGHT: 69 IN | DIASTOLIC BLOOD PRESSURE: 84 MMHG | WEIGHT: 139 LBS

## 2024-03-12 DIAGNOSIS — I70.213 ATHEROSCLEROSIS OF NATIVE ARTERY OF BOTH LOWER EXTREMITIES WITH INTERMITTENT CLAUDICATION (HCC): Primary | ICD-10-CM

## 2024-03-12 PROCEDURE — 3017F COLORECTAL CA SCREEN DOC REV: CPT | Performed by: SURGERY

## 2024-03-12 PROCEDURE — 99212 OFFICE O/P EST SF 10 MIN: CPT | Performed by: SURGERY

## 2024-03-12 PROCEDURE — G8484 FLU IMMUNIZE NO ADMIN: HCPCS | Performed by: SURGERY

## 2024-03-12 PROCEDURE — G8420 CALC BMI NORM PARAMETERS: HCPCS | Performed by: SURGERY

## 2024-03-12 PROCEDURE — G8427 DOCREV CUR MEDS BY ELIG CLIN: HCPCS | Performed by: SURGERY

## 2024-03-12 PROCEDURE — 1123F ACP DISCUSS/DSCN MKR DOCD: CPT | Performed by: SURGERY

## 2024-03-12 PROCEDURE — 4004F PT TOBACCO SCREEN RCVD TLK: CPT | Performed by: SURGERY

## 2024-03-12 RX ORDER — CILOSTAZOL 100 MG/1
100 TABLET ORAL 2 TIMES DAILY
Qty: 60 TABLET | Refills: 3 | Status: SHIPPED | OUTPATIENT
Start: 2024-03-12

## 2024-03-13 ENCOUNTER — HOSPITAL ENCOUNTER (OUTPATIENT)
Dept: CARDIAC REHAB | Age: 74
Setting detail: THERAPIES SERIES
Discharge: HOME | End: 2024-03-13
Payer: MEDICARE

## 2024-03-13 ENCOUNTER — TELEPHONE (OUTPATIENT)
Dept: VASCULAR SURGERY | Age: 74
End: 2024-03-13

## 2024-03-13 PROCEDURE — 93798 PHYS/QHP OP CAR RHAB W/ECG: CPT

## 2024-03-13 NOTE — TELEPHONE ENCOUNTER
Pt called in regards to the PLETAL he was prescribed by Dr. Mccann. Pt states that when he went to  his medication he was told that he was already taking two other blood thinners. Pt wants to make sure that Dr. Mccann is aware of this and that it is okay for him to take the the PLETAL along with his other medications. Pt has requested a callback as soon as possible. Best callback number is [phone].         Please advise.

## 2024-03-13 NOTE — TELEPHONE ENCOUNTER
Called patient and LVM.  Per Tosin Palomino NP, patient is fine to take all three medications.  Pletal works differently than the other two blood thinners to help with Claudication .  If he has any concerns then he may stop the baby aspirin.

## 2024-03-13 NOTE — TELEPHONE ENCOUNTER
Pt stopped into the office. He wants to make sure Dr. Mccann knows he is on plefal 100mg 2x day, ASA 81mg, Plavix 75mg daily. The pharmacist and cardiac rehab asked him if this was correct. Please call and advise [phone]

## 2024-03-15 ENCOUNTER — HOSPITAL ENCOUNTER (OUTPATIENT)
Dept: CARDIAC REHAB | Age: 74
Setting detail: THERAPIES SERIES
Discharge: HOME | End: 2024-03-15
Payer: MEDICARE

## 2024-03-15 PROCEDURE — 93798 PHYS/QHP OP CAR RHAB W/ECG: CPT

## 2024-03-18 ENCOUNTER — HOSPITAL ENCOUNTER (OUTPATIENT)
Dept: CARDIAC REHAB | Age: 74
Setting detail: THERAPIES SERIES
Discharge: HOME | End: 2024-03-18
Payer: MEDICARE

## 2024-03-18 PROCEDURE — 93798 PHYS/QHP OP CAR RHAB W/ECG: CPT

## 2024-03-20 ENCOUNTER — HOSPITAL ENCOUNTER (OUTPATIENT)
Dept: CARDIAC REHAB | Age: 74
Setting detail: THERAPIES SERIES
Discharge: HOME | End: 2024-03-20
Payer: MEDICARE

## 2024-03-20 PROCEDURE — 93798 PHYS/QHP OP CAR RHAB W/ECG: CPT

## 2024-03-22 ENCOUNTER — HOSPITAL ENCOUNTER (OUTPATIENT)
Dept: CARDIAC REHAB | Age: 74
Setting detail: THERAPIES SERIES
Discharge: HOME | End: 2024-03-22
Payer: MEDICARE

## 2024-03-22 PROCEDURE — 93798 PHYS/QHP OP CAR RHAB W/ECG: CPT

## 2024-03-25 ENCOUNTER — HOSPITAL ENCOUNTER (OUTPATIENT)
Dept: CARDIAC REHAB | Age: 74
Setting detail: THERAPIES SERIES
Discharge: HOME | End: 2024-03-25
Payer: MEDICARE

## 2024-03-25 PROCEDURE — 93798 PHYS/QHP OP CAR RHAB W/ECG: CPT

## 2024-03-27 ENCOUNTER — HOSPITAL ENCOUNTER (OUTPATIENT)
Dept: CARDIAC REHAB | Age: 74
Setting detail: THERAPIES SERIES
Discharge: HOME | End: 2024-03-27
Payer: MEDICARE

## 2024-03-27 PROCEDURE — 93798 PHYS/QHP OP CAR RHAB W/ECG: CPT

## 2024-03-29 ENCOUNTER — HOSPITAL ENCOUNTER (OUTPATIENT)
Dept: CARDIAC REHAB | Age: 74
Setting detail: THERAPIES SERIES
Discharge: HOME | End: 2024-03-29
Payer: MEDICARE

## 2024-03-29 ENCOUNTER — HOSPITAL ENCOUNTER (OUTPATIENT)
Age: 74
Discharge: HOME | End: 2024-03-29
Payer: MEDICARE

## 2024-03-29 LAB
ALBUMIN SERPL-MCNC: 4.3 G/DL (ref 3.4–5)
ALBUMIN/GLOB SERPL: 1.7 {RATIO} (ref 1.1–2.2)
ALP SERPL-CCNC: 85 U/L (ref 40–129)
ALT SERPL-CCNC: 6 U/L (ref 10–40)
ANION GAP SERPL CALCULATED.3IONS-SCNC: 10 MMOL/L (ref 3–16)
AST SERPL-CCNC: 12 U/L (ref 15–37)
BASOPHILS # BLD: 0 K/UL (ref 0–0.2)
BASOPHILS NFR BLD: 1 %
BILIRUB SERPL-MCNC: 0.7 MG/DL (ref 0–1)
BUN SERPL-MCNC: 7 MG/DL (ref 7–20)
CALCIUM SERPL-MCNC: 9.3 MG/DL (ref 8.3–10.6)
CHLORIDE SERPL-SCNC: 104 MMOL/L (ref 99–110)
CHOLEST SERPL-MCNC: 86 MG/DL (ref 0–199)
CO2 SERPL-SCNC: 27 MMOL/L (ref 21–32)
CREAT SERPL-MCNC: 0.5 MG/DL (ref 0.8–1.3)
DEPRECATED RDW RBC AUTO: 18.8 % (ref 12.4–15.4)
EOSINOPHIL # BLD: 0.1 K/UL (ref 0–0.6)
EOSINOPHIL NFR BLD: 1.5 %
GFR SERPLBLD CREATININE-BSD FMLA CKD-EPI: >90 ML/MIN/{1.73_M2}
GLUCOSE SERPL-MCNC: 101 MG/DL (ref 70–99)
HCT VFR BLD AUTO: 31.1 % (ref 40.5–52.5)
HDLC SERPL-MCNC: 51 MG/DL (ref 40–60)
HGB BLD-MCNC: 11 G/DL (ref 13.5–17.5)
LDLC SERPL CALC-MCNC: 22 MG/DL
LYMPHOCYTES # BLD: 1.1 K/UL (ref 1–5.1)
LYMPHOCYTES NFR BLD: 28 %
MCH RBC QN AUTO: 30.4 PG (ref 26–34)
MCHC RBC AUTO-ENTMCNC: 35.5 G/DL (ref 31–36)
MCV RBC AUTO: 85.6 FL (ref 80–100)
MONOCYTES # BLD: 0.5 K/UL (ref 0–1.3)
MONOCYTES NFR BLD: 11.4 %
NEUTROPHILS # BLD: 2.4 K/UL (ref 1.7–7.7)
NEUTROPHILS NFR BLD: 58.1 %
PLATELET # BLD AUTO: 347 K/UL (ref 135–450)
PMV BLD AUTO: 8.5 FL (ref 5–10.5)
POTASSIUM SERPL-SCNC: 4 MMOL/L (ref 3.5–5.1)
PROT SERPL-MCNC: 6.9 G/DL (ref 6.4–8.2)
RBC # BLD AUTO: 3.63 M/UL (ref 4.2–5.9)
SODIUM SERPL-SCNC: 141 MMOL/L (ref 136–145)
TRIGL SERPL-MCNC: 66 MG/DL (ref 0–150)
VLDLC SERPL CALC-MCNC: 13 MG/DL
WBC # BLD AUTO: 4.1 K/UL (ref 4–11)

## 2024-03-29 PROCEDURE — 80053 COMPREHEN METABOLIC PANEL: CPT

## 2024-03-29 PROCEDURE — 93798 PHYS/QHP OP CAR RHAB W/ECG: CPT

## 2024-03-29 PROCEDURE — 36415 COLL VENOUS BLD VENIPUNCTURE: CPT

## 2024-03-29 PROCEDURE — 80061 LIPID PANEL: CPT

## 2024-03-29 PROCEDURE — 85025 COMPLETE CBC W/AUTO DIFF WBC: CPT

## 2024-04-01 ENCOUNTER — HOSPITAL ENCOUNTER (OUTPATIENT)
Dept: CARDIAC REHAB | Age: 74
Setting detail: THERAPIES SERIES
Discharge: HOME | End: 2024-04-01
Payer: MEDICARE

## 2024-04-01 PROCEDURE — 93798 PHYS/QHP OP CAR RHAB W/ECG: CPT

## 2024-04-03 ENCOUNTER — HOSPITAL ENCOUNTER (OUTPATIENT)
Dept: CARDIAC REHAB | Age: 74
Setting detail: THERAPIES SERIES
Discharge: HOME | End: 2024-04-03
Payer: MEDICARE

## 2024-04-03 PROCEDURE — 93798 PHYS/QHP OP CAR RHAB W/ECG: CPT

## 2024-04-05 ENCOUNTER — HOSPITAL ENCOUNTER (OUTPATIENT)
Dept: CARDIAC REHAB | Age: 74
Setting detail: THERAPIES SERIES
Discharge: HOME | End: 2024-04-05
Payer: MEDICARE

## 2024-04-05 PROCEDURE — 93798 PHYS/QHP OP CAR RHAB W/ECG: CPT

## 2024-04-08 ENCOUNTER — TELEPHONE (OUTPATIENT)
Dept: CARDIAC REHAB | Age: 74
End: 2024-04-08

## 2024-04-08 ENCOUNTER — HOSPITAL ENCOUNTER (OUTPATIENT)
Dept: CARDIAC REHAB | Age: 74
Setting detail: THERAPIES SERIES
End: 2024-04-08
Payer: MEDICARE

## 2024-04-10 ENCOUNTER — HOSPITAL ENCOUNTER (OUTPATIENT)
Dept: CARDIAC REHAB | Age: 74
Setting detail: THERAPIES SERIES
Discharge: HOME | End: 2024-04-10
Payer: MEDICARE

## 2024-04-10 PROCEDURE — 93798 PHYS/QHP OP CAR RHAB W/ECG: CPT

## 2024-04-12 ENCOUNTER — HOSPITAL ENCOUNTER (OUTPATIENT)
Dept: CARDIAC REHAB | Age: 74
Setting detail: THERAPIES SERIES
Discharge: HOME | End: 2024-04-12
Payer: MEDICARE

## 2024-04-12 PROCEDURE — 93798 PHYS/QHP OP CAR RHAB W/ECG: CPT

## 2024-04-15 ENCOUNTER — HOSPITAL ENCOUNTER (OUTPATIENT)
Dept: CARDIAC REHAB | Age: 74
Setting detail: THERAPIES SERIES
Discharge: HOME | End: 2024-04-15
Payer: MEDICARE

## 2024-04-15 PROCEDURE — 93798 PHYS/QHP OP CAR RHAB W/ECG: CPT

## 2024-04-17 ENCOUNTER — HOSPITAL ENCOUNTER (OUTPATIENT)
Dept: CARDIAC REHAB | Age: 74
Setting detail: THERAPIES SERIES
End: 2024-04-17
Payer: MEDICARE

## 2024-04-22 ENCOUNTER — HOSPITAL ENCOUNTER (OUTPATIENT)
Dept: CARDIAC REHAB | Age: 74
Setting detail: THERAPIES SERIES
Discharge: HOME | End: 2024-04-22
Payer: MEDICARE

## 2024-04-22 PROCEDURE — 93798 PHYS/QHP OP CAR RHAB W/ECG: CPT

## 2024-04-24 ENCOUNTER — HOSPITAL ENCOUNTER (OUTPATIENT)
Dept: CARDIAC REHAB | Age: 74
Setting detail: THERAPIES SERIES
Discharge: HOME | End: 2024-04-24
Payer: MEDICARE

## 2024-04-24 PROCEDURE — 93798 PHYS/QHP OP CAR RHAB W/ECG: CPT

## 2024-04-26 ENCOUNTER — HOSPITAL ENCOUNTER (OUTPATIENT)
Dept: CARDIAC REHAB | Age: 74
Setting detail: THERAPIES SERIES
Discharge: HOME | End: 2024-04-26
Payer: MEDICARE

## 2024-04-26 PROCEDURE — 93798 PHYS/QHP OP CAR RHAB W/ECG: CPT

## 2024-04-29 ENCOUNTER — HOSPITAL ENCOUNTER (OUTPATIENT)
Dept: CARDIAC REHAB | Age: 74
Setting detail: THERAPIES SERIES
Discharge: HOME | End: 2024-04-29
Payer: MEDICARE

## 2024-04-29 PROCEDURE — 93798 PHYS/QHP OP CAR RHAB W/ECG: CPT

## 2024-05-01 ENCOUNTER — NURSE ONLY (OUTPATIENT)
Dept: CARDIOLOGY CLINIC | Age: 74
End: 2024-05-01
Payer: MEDICARE

## 2024-05-01 ENCOUNTER — HOSPITAL ENCOUNTER (OUTPATIENT)
Dept: CARDIAC REHAB | Age: 74
Setting detail: THERAPIES SERIES
Discharge: HOME | End: 2024-05-01
Payer: MEDICARE

## 2024-05-01 DIAGNOSIS — I49.9 IRREGULAR HEART BEAT: ICD-10-CM

## 2024-05-01 PROCEDURE — 93798 PHYS/QHP OP CAR RHAB W/ECG: CPT

## 2024-05-01 PROCEDURE — 93246 EXT ECG>7D<15D RECORDING: CPT | Performed by: INTERNAL MEDICINE

## 2024-05-03 ENCOUNTER — HOSPITAL ENCOUNTER (OUTPATIENT)
Dept: CARDIAC REHAB | Age: 74
Setting detail: THERAPIES SERIES
Discharge: HOME | End: 2024-05-03
Payer: MEDICARE

## 2024-05-03 ENCOUNTER — APPOINTMENT (OUTPATIENT)
Dept: CARDIAC REHAB | Age: 74
End: 2024-05-03
Payer: MEDICARE

## 2024-05-03 ENCOUNTER — HOSPITAL ENCOUNTER (OUTPATIENT)
Dept: CT IMAGING | Age: 74
Discharge: HOME | End: 2024-05-03
Payer: MEDICARE

## 2024-05-03 DIAGNOSIS — F17.210 CIGARETTE SMOKER: ICD-10-CM

## 2024-05-03 PROCEDURE — 71271 CT THORAX LUNG CANCER SCR C-: CPT

## 2024-05-03 PROCEDURE — 93798 PHYS/QHP OP CAR RHAB W/ECG: CPT

## 2024-05-06 ENCOUNTER — HOSPITAL ENCOUNTER (OUTPATIENT)
Dept: CARDIAC REHAB | Age: 74
Setting detail: THERAPIES SERIES
Discharge: HOME | End: 2024-05-06
Payer: MEDICARE

## 2024-05-06 ENCOUNTER — TELEPHONE (OUTPATIENT)
Dept: CARDIOLOGY CLINIC | Age: 74
End: 2024-05-06

## 2024-05-06 DIAGNOSIS — I25.10 CAD IN NATIVE ARTERY: Primary | ICD-10-CM

## 2024-05-06 DIAGNOSIS — Z95.5 S/P CORONARY ARTERY STENT PLACEMENT: ICD-10-CM

## 2024-05-06 PROCEDURE — 93798 PHYS/QHP OP CAR RHAB W/ECG: CPT

## 2024-05-08 ENCOUNTER — HOSPITAL ENCOUNTER (OUTPATIENT)
Dept: CARDIAC REHAB | Age: 74
Setting detail: THERAPIES SERIES
Discharge: HOME | End: 2024-05-08
Payer: MEDICARE

## 2024-05-08 PROCEDURE — 93798 PHYS/QHP OP CAR RHAB W/ECG: CPT

## 2024-05-09 RX ORDER — CLOPIDOGREL BISULFATE 75 MG/1
75 TABLET ORAL DAILY
Qty: 30 TABLET | Refills: 3 | Status: SHIPPED | OUTPATIENT
Start: 2024-05-09

## 2024-05-09 NOTE — TELEPHONE ENCOUNTER
Last OV: 04/01/2024  [facility]  Last Labs:-  Next OV: 06/11/2024   Ang[facility]  Clopidogrel-10/26/2023  [facility]

## 2024-05-10 ENCOUNTER — HOSPITAL ENCOUNTER (OUTPATIENT)
Dept: CARDIAC REHAB | Age: 74
Setting detail: THERAPIES SERIES
End: 2024-05-10
Payer: MEDICARE

## 2024-05-10 ENCOUNTER — TELEPHONE (OUTPATIENT)
Dept: CARDIAC REHAB | Age: 74
End: 2024-05-10

## 2024-05-13 ENCOUNTER — HOSPITAL ENCOUNTER (OUTPATIENT)
Dept: CARDIAC REHAB | Age: 74
Setting detail: THERAPIES SERIES
Discharge: HOME | End: 2024-05-13
Payer: MEDICARE

## 2024-05-13 PROCEDURE — 93798 PHYS/QHP OP CAR RHAB W/ECG: CPT

## 2024-05-15 ENCOUNTER — HOSPITAL ENCOUNTER (OUTPATIENT)
Dept: CARDIAC REHAB | Age: 74
Setting detail: THERAPIES SERIES
Discharge: HOME | End: 2024-05-15
Payer: MEDICARE

## 2024-05-15 PROCEDURE — 93798 PHYS/QHP OP CAR RHAB W/ECG: CPT

## 2024-05-17 ENCOUNTER — HOSPITAL ENCOUNTER (OUTPATIENT)
Dept: CARDIAC REHAB | Age: 74
Setting detail: THERAPIES SERIES
Discharge: HOME | End: 2024-05-17
Payer: MEDICARE

## 2024-05-17 PROCEDURE — 93798 PHYS/QHP OP CAR RHAB W/ECG: CPT

## 2024-05-17 NOTE — PROGRESS NOTES
Interventional Cardiology Consultation     Ezra Gonzalez  1950    PCP: Jazmin Noyola (Inactive)  Referring Physician: Dr. Au  Reason for Referral: PAD  Chief Complaint: \"my legs hurt\"  Chief Complaint   Patient presents with    New Patient     PT referred by Dr. Au.       Subjective:   History of Present Illness: The patient is 74 y.o. male with a past medical history significant for CAD and HTN. Patient follows with Dr. Au for primary cardiology. Patient here today for second opinion regarding peripheral artery disease. Patient reports increased claudication, worsening with walking. Patient tries to stay active with walking and grass cutting, however claudication is lifestyle limiting.       Past Medical History:   Diagnosis Date    CAD (coronary artery disease)     Glaucoma     Hypertension      Past Surgical History:   Procedure Laterality Date    ARM SURGERY      CATARACT EXTRACTION      DIAGNOSTIC CARDIAC CATH LAB PROCEDURE       Family History   Problem Relation Age of Onset    Cancer Father      Social History     Tobacco Use    Smoking status: Some Days     Current packs/day: 0.25     Average packs/day: 1 pack/day for 55.5 years (55.1 ttl pk-yrs)     Types: Cigarettes     Start date: 12/1/2023    Smokeless tobacco: Never    Tobacco comments:     Trying to quit. Smokes 2-3 cigarettes daily.   Vaping Use    Vaping Use: Never used   Substance Use Topics    Alcohol use: Yes     Comment: nightly    Drug use: Not Currently     Comment: none x 6 months       No Known Allergies    Current Outpatient Medications   Medication Sig Dispense Refill    ferrous sulfate (IRON 325) 325 (65 Fe) MG tablet Take 1 tablet by mouth daily (with breakfast)      albuterol sulfate HFA (PROVENTIL;VENTOLIN;PROAIR) 108 (90 Base) MCG/ACT inhaler Inhale 1 puff into the lungs every 4 hours as needed      clopidogrel (PLAVIX) 75 MG tablet Take 1 tablet by mouth daily 30 tablet 3    cilostazol (PLETAL) 100 MG tablet

## 2024-05-21 ENCOUNTER — TELEPHONE (OUTPATIENT)
Dept: CARDIOLOGY CLINIC | Age: 74
End: 2024-05-21

## 2024-05-21 ENCOUNTER — OFFICE VISIT (OUTPATIENT)
Dept: CARDIOLOGY CLINIC | Age: 74
End: 2024-05-21
Payer: MEDICARE

## 2024-05-21 VITALS
SYSTOLIC BLOOD PRESSURE: 130 MMHG | BODY MASS INDEX: 20.44 KG/M2 | OXYGEN SATURATION: 95 % | DIASTOLIC BLOOD PRESSURE: 60 MMHG | WEIGHT: 138 LBS | HEIGHT: 69 IN | HEART RATE: 69 BPM

## 2024-05-21 DIAGNOSIS — I73.9 PAD (PERIPHERAL ARTERY DISEASE) (HCC): ICD-10-CM

## 2024-05-21 DIAGNOSIS — R93.6 ABNORMAL FINDINGS ON DIAGNOSTIC IMAGING OF LIMBS: ICD-10-CM

## 2024-05-21 DIAGNOSIS — I73.9 CLAUDICATION (HCC): Primary | ICD-10-CM

## 2024-05-21 PROCEDURE — G8420 CALC BMI NORM PARAMETERS: HCPCS | Performed by: INTERNAL MEDICINE

## 2024-05-21 PROCEDURE — 4004F PT TOBACCO SCREEN RCVD TLK: CPT | Performed by: INTERNAL MEDICINE

## 2024-05-21 PROCEDURE — 99214 OFFICE O/P EST MOD 30 MIN: CPT | Performed by: INTERNAL MEDICINE

## 2024-05-21 PROCEDURE — G8427 DOCREV CUR MEDS BY ELIG CLIN: HCPCS | Performed by: INTERNAL MEDICINE

## 2024-05-21 PROCEDURE — 3017F COLORECTAL CA SCREEN DOC REV: CPT | Performed by: INTERNAL MEDICINE

## 2024-05-21 PROCEDURE — 1123F ACP DISCUSS/DSCN MKR DOCD: CPT | Performed by: INTERNAL MEDICINE

## 2024-05-21 RX ORDER — ALBUTEROL SULFATE 90 UG/1
1 AEROSOL, METERED RESPIRATORY (INHALATION) EVERY 4 HOURS PRN
COMMUNITY
Start: 2024-04-19

## 2024-05-21 RX ORDER — FERROUS SULFATE 325(65) MG
325 TABLET ORAL
COMMUNITY

## 2024-05-21 NOTE — TELEPHONE ENCOUNTER
Date of Procedure: Thursday 5/30/24 @ [facility] with Dr. Morton     Time of arrival: 11:30 am     Procedure time: 1:00 pm     Called and spoke to Ezra and he is agreeable to date and time. Reviewed instructions and he verbalized understanding. Encouraged to call with any questions or concerns.     Published on [employer] and e-mail to Marielle.

## 2024-05-21 NOTE — TELEPHONE ENCOUNTER
Triny,  Please schedule patient for left SFA peripheral angiogram with possible interventions with Dr. Morton.  Thanks!     Please go for blood work one week prior to your procedure.       The morning of your procedure you will park in the hospital parking lot and report directly to the cath lab to check in.     Pre-Procedure Instructions   You will need to fast for at least 8 hours prior to procedure. No caffeine the morning of.   Hold all diabetic medications including, Metfomin.  If you take Lantus/Levemir only take ½ your normal dose the evening before.  All other medications can be taken in the morning with sips of water.   You will need to take 325 mg aspirin the morning of.  If you are currently taking 81 mg please take 4 tablets that morning.   Do not use any lotions, creams or perfume the morning of procedure.   Pre-procedure lab work will need to be completed 5-7 days prior to procedure.   Please have a responsible adult to drive you home after procedure. We advise you have someone to stay with you for 24 hours following procedure for precautionary measures. Depending on procedure you may require an overnight stay.   Cath lab will provide you with all post procedure instructions.     If you have any questions regarding the procedure itself or medications, please call [phone] and ask to speak with a nurse.

## 2024-05-29 ENCOUNTER — HOSPITAL ENCOUNTER (OUTPATIENT)
Age: 74
Discharge: HOME | End: 2024-05-29
Payer: MEDICARE

## 2024-05-29 DIAGNOSIS — R93.6 ABNORMAL FINDINGS ON DIAGNOSTIC IMAGING OF LIMBS: ICD-10-CM

## 2024-05-29 DIAGNOSIS — I73.9 CLAUDICATION (HCC): ICD-10-CM

## 2024-05-29 DIAGNOSIS — I73.9 PAD (PERIPHERAL ARTERY DISEASE) (HCC): ICD-10-CM

## 2024-05-29 LAB
ANION GAP SERPL CALCULATED.3IONS-SCNC: 11 MMOL/L (ref 3–16)
BUN SERPL-MCNC: 7 MG/DL (ref 7–20)
CALCIUM SERPL-MCNC: 9.1 MG/DL (ref 8.3–10.6)
CHLORIDE SERPL-SCNC: 104 MMOL/L (ref 99–110)
CO2 SERPL-SCNC: 24 MMOL/L (ref 21–32)
CREAT SERPL-MCNC: 0.6 MG/DL (ref 0.8–1.3)
DEPRECATED RDW RBC AUTO: 18.6 % (ref 12.4–15.4)
GFR SERPLBLD CREATININE-BSD FMLA CKD-EPI: >90 ML/MIN/{1.73_M2}
GLUCOSE SERPL-MCNC: 93 MG/DL (ref 70–99)
HCT VFR BLD AUTO: 34.3 % (ref 40.5–52.5)
HGB BLD-MCNC: 11.9 G/DL (ref 13.5–17.5)
MCH RBC QN AUTO: 29.7 PG (ref 26–34)
MCHC RBC AUTO-ENTMCNC: 34.8 G/DL (ref 31–36)
MCV RBC AUTO: 85.3 FL (ref 80–100)
PLATELET # BLD AUTO: 412 K/UL (ref 135–450)
PMV BLD AUTO: 8.8 FL (ref 5–10.5)
POTASSIUM SERPL-SCNC: 4.7 MMOL/L (ref 3.5–5.1)
RBC # BLD AUTO: 4.02 M/UL (ref 4.2–5.9)
SODIUM SERPL-SCNC: 139 MMOL/L (ref 136–145)
WBC # BLD AUTO: 5.8 K/UL (ref 4–11)

## 2024-05-29 PROCEDURE — 36415 COLL VENOUS BLD VENIPUNCTURE: CPT

## 2024-05-29 PROCEDURE — 84460 ALANINE AMINO (ALT) (SGPT): CPT

## 2024-05-29 PROCEDURE — 80048 BASIC METABOLIC PNL TOTAL CA: CPT

## 2024-05-29 PROCEDURE — 93248 EXT ECG>7D<15D REV&INTERPJ: CPT | Performed by: INTERNAL MEDICINE

## 2024-05-29 PROCEDURE — 84450 TRANSFERASE (AST) (SGOT): CPT

## 2024-05-29 PROCEDURE — 85027 COMPLETE CBC AUTOMATED: CPT

## 2024-05-30 ENCOUNTER — TELEPHONE (OUTPATIENT)
Dept: CARDIOLOGY CLINIC | Age: 74
End: 2024-05-30

## 2024-05-30 ENCOUNTER — HOSPITAL ENCOUNTER (OUTPATIENT)
Age: 74
Setting detail: OUTPATIENT SURGERY
Discharge: HOME | End: 2024-05-30
Attending: INTERNAL MEDICINE | Admitting: INTERNAL MEDICINE
Payer: MEDICARE

## 2024-05-30 VITALS
TEMPERATURE: 98.1 F | BODY MASS INDEX: 20.59 KG/M2 | SYSTOLIC BLOOD PRESSURE: 104 MMHG | OXYGEN SATURATION: 95 % | HEIGHT: 69 IN | WEIGHT: 139 LBS | DIASTOLIC BLOOD PRESSURE: 82 MMHG | HEART RATE: 62 BPM | RESPIRATION RATE: 14 BRPM

## 2024-05-30 DIAGNOSIS — I73.9 PAD (PERIPHERAL ARTERY DISEASE) (HCC): ICD-10-CM

## 2024-05-30 LAB
ALT SERPL-CCNC: 9 U/L (ref 10–40)
AST SERPL-CCNC: 14 U/L (ref 15–37)
ECHO BSA: 1.75 M2

## 2024-05-30 PROCEDURE — C1887 CATHETER, GUIDING: HCPCS | Performed by: INTERNAL MEDICINE

## 2024-05-30 PROCEDURE — C1760 CLOSURE DEV, VASC: HCPCS | Performed by: INTERNAL MEDICINE

## 2024-05-30 PROCEDURE — 75774 ARTERY X-RAY EACH VESSEL: CPT | Performed by: INTERNAL MEDICINE

## 2024-05-30 PROCEDURE — 2500000003 HC RX 250 WO HCPCS: Performed by: INTERNAL MEDICINE

## 2024-05-30 PROCEDURE — 99152 MOD SED SAME PHYS/QHP 5/>YRS: CPT | Performed by: INTERNAL MEDICINE

## 2024-05-30 PROCEDURE — C2623 CATH, TRANSLUMIN, DRUG-COAT: HCPCS | Performed by: INTERNAL MEDICINE

## 2024-05-30 PROCEDURE — 6360000002 HC RX W HCPCS: Performed by: INTERNAL MEDICINE

## 2024-05-30 PROCEDURE — 2720000010 HC SURG SUPPLY STERILE: Performed by: INTERNAL MEDICINE

## 2024-05-30 PROCEDURE — 37184 PRIM ART M-THRMBC 1ST VSL: CPT | Performed by: INTERNAL MEDICINE

## 2024-05-30 PROCEDURE — C1725 CATH, TRANSLUMIN NON-LASER: HCPCS | Performed by: INTERNAL MEDICINE

## 2024-05-30 PROCEDURE — 2709999900 HC NON-CHARGEABLE SUPPLY: Performed by: INTERNAL MEDICINE

## 2024-05-30 PROCEDURE — 37226 HC FEM POP TERR PLASTY AND STENT: CPT | Performed by: INTERNAL MEDICINE

## 2024-05-30 PROCEDURE — 2580000003 HC RX 258: Performed by: INTERNAL MEDICINE

## 2024-05-30 PROCEDURE — C1894 INTRO/SHEATH, NON-LASER: HCPCS | Performed by: INTERNAL MEDICINE

## 2024-05-30 PROCEDURE — C1751 CATH, INF, PER/CENT/MIDLINE: HCPCS | Performed by: INTERNAL MEDICINE

## 2024-05-30 PROCEDURE — 99153 MOD SED SAME PHYS/QHP EA: CPT | Performed by: INTERNAL MEDICINE

## 2024-05-30 PROCEDURE — 6360000004 HC RX CONTRAST MEDICATION: Performed by: INTERNAL MEDICINE

## 2024-05-30 PROCEDURE — 75710 ARTERY X-RAYS ARM/LEG: CPT | Performed by: INTERNAL MEDICINE

## 2024-05-30 PROCEDURE — 7100000010 HC PHASE II RECOVERY - FIRST 15 MIN: Performed by: INTERNAL MEDICINE

## 2024-05-30 PROCEDURE — 37185 PRIM ART M-THRMBC SBSQ VSL: CPT | Performed by: INTERNAL MEDICINE

## 2024-05-30 PROCEDURE — C1876 STENT, NON-COA/NON-COV W/DEL: HCPCS | Performed by: INTERNAL MEDICINE

## 2024-05-30 PROCEDURE — 37224 HC FEM POP TERRITORY PLASTY: CPT | Performed by: INTERNAL MEDICINE

## 2024-05-30 PROCEDURE — C1769 GUIDE WIRE: HCPCS | Performed by: INTERNAL MEDICINE

## 2024-05-30 PROCEDURE — 7100000011 HC PHASE II RECOVERY - ADDTL 15 MIN: Performed by: INTERNAL MEDICINE

## 2024-05-30 DEVICE — SELF-EXPANDING STENT SYSTEM
Type: IMPLANTABLE DEVICE | Status: FUNCTIONAL
Brand: EPIC™ VASCULAR

## 2024-05-30 DEVICE — ABSOLUTE PRO VASCULAR SELF-EXPANDING STENT SYSTEM 8.0 MM X 60 MM X 135 CM / OVER-THE-WIRE
Type: IMPLANTABLE DEVICE | Status: FUNCTIONAL
Brand: ABSOLUTE PRO

## 2024-05-30 DEVICE — DRUG-ELUTING VASCULAR STENT SYSTEM
Type: IMPLANTABLE DEVICE | Status: FUNCTIONAL
Brand: ELUVIA™

## 2024-05-30 RX ORDER — DIPHENHYDRAMINE HYDROCHLORIDE 50 MG/ML
INJECTION INTRAMUSCULAR; INTRAVENOUS PRN
Status: DISCONTINUED | OUTPATIENT
Start: 2024-05-30 | End: 2024-05-30 | Stop reason: HOSPADM

## 2024-05-30 RX ORDER — SODIUM CHLORIDE 0.9 % (FLUSH) 0.9 %
5-40 SYRINGE (ML) INJECTION EVERY 12 HOURS SCHEDULED
Status: DISCONTINUED | OUTPATIENT
Start: 2024-05-30 | End: 2024-05-30 | Stop reason: HOSPADM

## 2024-05-30 RX ORDER — NITROGLYCERIN 20 MG/100ML
INJECTION INTRAVENOUS CONTINUOUS PRN
Status: COMPLETED | OUTPATIENT
Start: 2024-05-30 | End: 2024-05-30

## 2024-05-30 RX ORDER — SODIUM CHLORIDE 9 MG/ML
INJECTION, SOLUTION INTRAVENOUS PRN
Status: DISCONTINUED | OUTPATIENT
Start: 2024-05-30 | End: 2024-05-30 | Stop reason: HOSPADM

## 2024-05-30 RX ORDER — SODIUM CHLORIDE 0.9 % (FLUSH) 0.9 %
5-40 SYRINGE (ML) INJECTION PRN
Status: DISCONTINUED | OUTPATIENT
Start: 2024-05-30 | End: 2024-05-30 | Stop reason: HOSPADM

## 2024-05-30 RX ORDER — HEPARIN SODIUM 1000 [USP'U]/ML
INJECTION, SOLUTION INTRAVENOUS; SUBCUTANEOUS PRN
Status: DISCONTINUED | OUTPATIENT
Start: 2024-05-30 | End: 2024-05-30 | Stop reason: HOSPADM

## 2024-05-30 RX ORDER — ACETAMINOPHEN 325 MG/1
650 TABLET ORAL EVERY 4 HOURS PRN
Status: DISCONTINUED | OUTPATIENT
Start: 2024-05-30 | End: 2024-05-30 | Stop reason: HOSPADM

## 2024-05-30 RX ORDER — MIDAZOLAM HYDROCHLORIDE 1 MG/ML
INJECTION INTRAMUSCULAR; INTRAVENOUS PRN
Status: DISCONTINUED | OUTPATIENT
Start: 2024-05-30 | End: 2024-05-30 | Stop reason: HOSPADM

## 2024-05-30 RX ORDER — ASPIRIN 325 MG
325 TABLET ORAL ONCE
Status: DISCONTINUED | OUTPATIENT
Start: 2024-05-30 | End: 2024-05-30 | Stop reason: HOSPADM

## 2024-05-30 RX ORDER — FENTANYL CITRATE 50 UG/ML
INJECTION, SOLUTION INTRAMUSCULAR; INTRAVENOUS PRN
Status: DISCONTINUED | OUTPATIENT
Start: 2024-05-30 | End: 2024-05-30 | Stop reason: HOSPADM

## 2024-05-30 RX ADMIN — SODIUM CHLORIDE 75 ML/HR: 9 INJECTION, SOLUTION INTRAVENOUS at 12:52

## 2024-05-30 NOTE — DISCHARGE INSTRUCTIONS
PERIPHERAL ANGIOGRAM    Care of your puncture site:  Remove bandage 24 hours after the procedure.  May shower in 24 hours but do not sit in a bathtub/pool of water for 5 days or until the wound is healed.  Gently clean groin using soap and water.  Dry thoroughly and apply a Band-Aid that covers the entire site. Use Band-Aid until skin heals over in about 3-5 days.  Do not apply powder or lotion.      Normal Observations:  Soreness or tenderness which may last one week.  Mild oozing from the incision site.  Possible bruising that could last 2 weeks.    Activity:  You may resume driving 24 hours following the procedure.  Do not make important / legal decisions within 24 hours after procedure.  You may resume normal activity in 5 days or after the wound heals.  Avoid lifting more than 10 pounds for 5 days or until the wound heals.  Avoid strenuous exercise or activity for 1 week.      Nutrition:  Regular diet.  Drink at least 8 to 10 glasses of decaffeinated, non-alcoholic fluid for the next 24 hours to flush the x-ray dye used for your angiogram out of your body.    Call your doctor immediately if your condition worsens, for any other concerns, for a follow-up appointment or if you experience any of the following:  Increased swelling on the groin or leg.  Unusual pain, numbness, or tingling of the groin or down the leg.  Any signs of infection such as: redness, yellow drainage at the site, swelling or pain.    IF GROIN STARTS BLEEDING SIGNIFICANTLY:   LAY FLAT, HOLD FIRM DIRECT PRESSURE, AND CALL 911    Other Instructions:

## 2024-05-30 NOTE — H&P
H&P Update - see note from 2024      I have reviewed the history and physical and examined the patient and find no relevant changes.   I have reviewed with the patient and/or family the risks, benefits, and alternatives to the procedure.    Pre-sedation Assessment    Patient:  Ezra Gonzalez   :   1950  Intended Procedure: peripheral angiogram     There were no vitals filed for this visit.    Nursing notes reviewed and agreed.  Medications reviewed  Allergies: No Known Allergies      Pre-Procedure Assessment/Plan:  ASA 2 - Patient with mild systemic disease with no functional limitations    Mallampati Airway Assessment:  Mallampati Class I - (soft palate, fauces, uvula & anterior/posterior tonsillar pillars are visible)    Level of Sedation Plan:Mild sedation    Post Procedure plan: Return to same level of care    Minesh Morton MD FACC  General, Interventional Cardiology, and Peripheral Vascular Disease   [facility]   (C): [phone]  (O): [phone]

## 2024-05-30 NOTE — TELEPHONE ENCOUNTER
LMOM for pt to call back for message from  [facility]. Unable to leave results message per HIPAA.

## 2024-05-30 NOTE — PROGRESS NOTES
1555  Returned to room from procedural area.  Patient snoring, respirations unlabored.  Right groin site soft, dressing intact  1610  Dr Morton at bedside, discussing results of procedure.  1630  Drinking soda and eating snacks without difficulty  1740  Patient bent left leg with permission from nurse, states leg feels tight.  Left thigh appears larger and more firm than right thigh.  Circumference just above knees:  Left 14.5 inches, Right 13 inches.    Mid Thigh:  Left 16.5 inches, Right 15.5 inches.  Dr Morton made aware of left thigh swelling. No new orders.  Distal pulses present.    1800:  Resting on stretcher eating and drinking without difficulty.  Call light in reach.  Pedal pulses unchanged  1830  Left thigh feels softer proximally, still firm just above knee.  Left thigh circumference measurements are unchanged  1850  Transferred to discharge bridge via wheelchair.  Ambulating without difficulty.  Groin site soft.

## 2024-05-31 ENCOUNTER — TELEPHONE (OUTPATIENT)
Dept: CARDIOLOGY CLINIC | Age: 74
End: 2024-05-31

## 2024-05-31 NOTE — PROGRESS NOTES
05/31/24 1500:  Patient called into cath lab pre/post to discuss his left leg feeling stiff and sore in the back of leg.  Patient states, \"It also feels tight.\"  Patient states, \"I always push myself, so today I got on the treadmill for 5 minutes and the bicycle for 5 minutes.  Patient also said, \"I got in the hot tub to make it feel better.:    I reinforced the discharge instructions:  NO treadmill, bicycle, strenuous exercise for 5 days and NO hot tub, bath tub or pools for 5 days.    1506:  Dr. Morton informed of  patient's call, concerns and activities.  Dr. Morton asked me to call patient and tell him to take tylenol and no strenuous exercise, bathtub, hot tub or pools of water for 5 days.    1508:  Patient called and informed of the above from Dr. Morton.  Patient verbalized he could take Tylenol for the discomfort and to take it easy for 5 day and no hot tub or bath tub.  Electronically signed by Naida Jiang RN on 5/31/2024 at 3:19 PM

## 2024-05-31 NOTE — PROGRESS NOTES
[facility]      CARDIAC FOLLOW UP  [phone]  6/11/24  Referring: Dr. Jazmin Noyola    REASON FOR CONSULT/CHIEF COMPLAINT/HPI     Reason for visit/ Chief complaint  Dyspnea on Exertion  Dizziness Claudication  CAD   HPI Ezra Gonzalez is a 74 y.o. male patient here for follow up of CAD and PAD, as well as to review results of a recent event monitor.    I met him initially in June 22' for consultation for a variety of symptoms including shortness of breath and dizziness.  Claudication has been his main symptom.    He had a stent a few months ago to his coronaries, and has felt very well since then.  He was subsequently found to have severe PAD with plan for a left leg fem-pop bypass later.  Patient sought a second opinion and had perctaneous stenting to his left leg instead of surgery with Dr. Morton, who is planning to work on his right leg soon.    Today he states that he is doing well. He had his L leg surgery and is doing well. He feels great. No complaints of CP, SOB, palpitations or dizziness at this time. His BP is running 120's/60's.     We reviewed his monitor results in office today.     He would like to go to the [facility] for walking trip in September/October to see the leaves change.  He is planning to hike over 100 miles in several days.  He notes that now that he is able to be more active, he re-injured his left knee, but he has been doing his own rehab at home so far.    Patient is adherent with medications and is tolerating them well without side effects     HISTORY/ALLERGIES/ROS     MedHx: Tobacco abuse, CAD, PVC  SurgHx:  has a past surgical history that includes Diagnostic Cardiac Cath Lab Procedure; Arm Surgery; Cataract extraction; and Cardiac procedure (Left, 5/30/2024).   SocHx:  reports that he has been smoking cigarettes. He started smoking about 6 months ago. He has a 55.1 pack-year smoking history. He has never used smokeless tobacco. He reports current alcohol

## 2024-05-31 NOTE — TELEPHONE ENCOUNTER
Ezra called in to make his 2 week f/up appt but [facility] doesn't have anything open until 7/16. [facility] [facility] [place] [facility].     Ezra also shared he had procedure yesterday but is experiencing swelling around his groin area. Ezra states it is hard to walk on and L leg has puffed up. He would like to know what he can do for the pain and is the swelling normal?    Please advise.

## 2024-05-31 NOTE — TELEPHONE ENCOUNTER
Per Dr. Morton's op note from 5/30/24:  \"Repeat GEMINI in the office in 4-6 weeks\"     Called patient to further assess symptoms and schedule for 4-6 week f/u.   Patient reports overall he notices his leg feeling much better. Reports mild edema of his left knee since his procedure yesterday. Reports mild discomfort associated with this swelling more when he is up moving around.  States groin site is clean, dry, and intact. Advised patient to try ice/elevation for swelling. Advised to monitor symptoms and groin site. Call if symptoms worsen or fail to improve. Patient asked if it was okay for him to walk on the treadmill at the [facility]. Informed patient that walking is encouraged. Scheduled patient for f/u Monday [facility]8 at [facility] at 230 pm. Patient v/u.

## 2024-06-04 ENCOUNTER — TELEPHONE (OUTPATIENT)
Dept: CARDIOLOGY CLINIC | Age: 74
End: 2024-06-04

## 2024-06-04 NOTE — TELEPHONE ENCOUNTER
Ezra, called in he is wanting to know now that it has been 5 days since his stent was placed if he could use the treadmill?     Ezra can be reached at [phone]

## 2024-06-04 NOTE — TELEPHONE ENCOUNTER
As stated in previous encounter 5/31/2024, okay for patient to use the treadmill. Walking is encouraged following PAD procedures.   Thanks

## 2024-06-05 ENCOUNTER — TELEPHONE (OUTPATIENT)
Dept: CARDIOLOGY CLINIC | Age: 74
End: 2024-06-05

## 2024-06-05 NOTE — TELEPHONE ENCOUNTER
Ezra called the office wanting to know if the bruising post procedure is normal? He shared that he has some associated pain but thinks it could be in relation to a previous knee injury. Ezra states the swelling is covering his entire left leg on the inside and outer part.     Ezra states he has applied ice and heat, which helped with the swelling.     Ezra is also wanting to know if he could wear a knee brace to aide in walking, due to previous knee injury?    Ezra's callback: [phone]

## 2024-06-05 NOTE — TELEPHONE ENCOUNTER
Spoke with patient regarding concerns. States overall his left leg is feeling better since his procedure. States he has been walking on the treadmill about 10 minutes daily with no complaints of new claudication. States his swelling has gone down significantly. Reports bruising still present to left leg, however denies pain. Advised to continue ice therapy. Encouraged to continue to monitor and call with any new or worsening symptoms. Patient v/u.

## 2024-06-11 ENCOUNTER — OFFICE VISIT (OUTPATIENT)
Dept: CARDIOLOGY CLINIC | Age: 74
End: 2024-06-11
Payer: MEDICARE

## 2024-06-11 VITALS
HEIGHT: 69 IN | DIASTOLIC BLOOD PRESSURE: 60 MMHG | WEIGHT: 137.7 LBS | SYSTOLIC BLOOD PRESSURE: 130 MMHG | OXYGEN SATURATION: 98 % | BODY MASS INDEX: 20.4 KG/M2 | HEART RATE: 80 BPM

## 2024-06-11 DIAGNOSIS — I70.212 ATHEROSCLEROSIS OF NATIVE ARTERIES OF EXTREMITIES WITH INTERMITTENT CLAUDICATION, LEFT LEG (HCC): ICD-10-CM

## 2024-06-11 DIAGNOSIS — I25.10 CAD IN NATIVE ARTERY: Primary | ICD-10-CM

## 2024-06-11 DIAGNOSIS — I47.19 ATRIAL TACHYCARDIA (HCC): ICD-10-CM

## 2024-06-11 DIAGNOSIS — F17.200 SMOKER: ICD-10-CM

## 2024-06-11 PROCEDURE — G8427 DOCREV CUR MEDS BY ELIG CLIN: HCPCS | Performed by: INTERNAL MEDICINE

## 2024-06-11 PROCEDURE — G8420 CALC BMI NORM PARAMETERS: HCPCS | Performed by: INTERNAL MEDICINE

## 2024-06-11 PROCEDURE — 3017F COLORECTAL CA SCREEN DOC REV: CPT | Performed by: INTERNAL MEDICINE

## 2024-06-11 PROCEDURE — 1123F ACP DISCUSS/DSCN MKR DOCD: CPT | Performed by: INTERNAL MEDICINE

## 2024-06-11 PROCEDURE — G2211 COMPLEX E/M VISIT ADD ON: HCPCS | Performed by: INTERNAL MEDICINE

## 2024-06-11 PROCEDURE — 4004F PT TOBACCO SCREEN RCVD TLK: CPT | Performed by: INTERNAL MEDICINE

## 2024-06-11 PROCEDURE — 99214 OFFICE O/P EST MOD 30 MIN: CPT | Performed by: INTERNAL MEDICINE

## 2024-06-11 NOTE — PATIENT INSTRUCTIONS
Follow up with Dr Au in 3 months with stress test same day     Call for any questions or concerns.

## 2024-06-28 NOTE — PROGRESS NOTES
PAD/Claudication  -rc 3, lifestyle limiting  -previous peripheral revascularizations with Dr. Mccann; asking for second opinion about revascularization   -continue asa/statin/plavix  -s/p successful balloon angioplasty, throbmectomy and stenting of the left SFA ( X 3) 5/30/24  -symptoms significantly improved on the left leg  -reports continued claudication of the right leg  -recommend peripheral angiogram of the right leg from the radial approach     Follow up after procedure     Thank you very much for allowing me to participate in the care of your patient. Please do not hesitate to contact me if you have any questions.      Minesh Morton MD [facility]  General, Interventional Cardiology, and Peripheral Vascular Disease   [facility]   Ph: [phone]  Fax: [phone]      This note was scribed in the presence of Dr. Minesh Morton MD by Cynthia Han, ROMAN    Physician Attestation:  The scribes documentation has been prepared under my direction and personally reviewed by me in its entirety.     I confirm the note above accurately reflects all work, treatment, procedures, and medical decision making performed by me.    Electronically signed by Minesh Morton MD on 7/23/2024 at 4:24 PM

## 2024-07-05 RX ORDER — ASPIRIN 81 MG/1
81 TABLET, COATED ORAL DAILY
Qty: 90 TABLET | Refills: 1 | Status: SHIPPED | OUTPATIENT
Start: 2024-07-05

## 2024-07-08 ENCOUNTER — TELEPHONE (OUTPATIENT)
Dept: CARDIOLOGY CLINIC | Age: 74
End: 2024-07-08

## 2024-07-08 ENCOUNTER — OFFICE VISIT (OUTPATIENT)
Dept: CARDIOLOGY CLINIC | Age: 74
End: 2024-07-08
Payer: MEDICARE

## 2024-07-08 VITALS
WEIGHT: 135.8 LBS | HEART RATE: 96 BPM | HEIGHT: 69 IN | OXYGEN SATURATION: 96 % | BODY MASS INDEX: 20.11 KG/M2 | DIASTOLIC BLOOD PRESSURE: 62 MMHG | SYSTOLIC BLOOD PRESSURE: 108 MMHG

## 2024-07-08 DIAGNOSIS — R93.6 ABNORMAL FINDINGS ON DIAGNOSTIC IMAGING OF LIMBS: ICD-10-CM

## 2024-07-08 DIAGNOSIS — I70.211 ATHEROSCLEROSIS OF NATIVE ARTERIES OF EXTREMITIES WITH INTERMITTENT CLAUDICATION, RIGHT LEG (HCC): ICD-10-CM

## 2024-07-08 DIAGNOSIS — I73.9 PAD (PERIPHERAL ARTERY DISEASE) (HCC): Primary | ICD-10-CM

## 2024-07-08 PROCEDURE — 99214 OFFICE O/P EST MOD 30 MIN: CPT | Performed by: INTERNAL MEDICINE

## 2024-07-08 PROCEDURE — G8427 DOCREV CUR MEDS BY ELIG CLIN: HCPCS | Performed by: INTERNAL MEDICINE

## 2024-07-08 PROCEDURE — 3017F COLORECTAL CA SCREEN DOC REV: CPT | Performed by: INTERNAL MEDICINE

## 2024-07-08 PROCEDURE — 4004F PT TOBACCO SCREEN RCVD TLK: CPT | Performed by: INTERNAL MEDICINE

## 2024-07-08 PROCEDURE — G8420 CALC BMI NORM PARAMETERS: HCPCS | Performed by: INTERNAL MEDICINE

## 2024-07-08 PROCEDURE — 1123F ACP DISCUSS/DSCN MKR DOCD: CPT | Performed by: INTERNAL MEDICINE

## 2024-07-08 NOTE — TELEPHONE ENCOUNTER
Triny,   Please schedule patient for peripheral angiogram of the right leg with possible interventions from the radial approach with Dr. Morton at [place]. Orders are in. Okay for patient to wait to schedule after his vacation per patient request.   Thanks!     Please go for blood work one week prior to your procedure.       The morning of your procedure you will park in the hospital parking lot and report directly to the registration desk for check in.    Pre-Procedure Instructions   You will need to fast for at least 8 hours prior to procedure. No caffeine the morning of.   Hold all diabetic medications including, Metfomin.  If you take Lantus/Levemir only take ½ your normal dose the evening before.  All other medications can be taken in the morning with sips of water.   You will need to take 325 mg aspirin the morning of.  If you are currently taking 81 mg please take 4 tablets that morning.   Do not use any lotions, creams or perfume the morning of procedure.   Pre-procedure lab work will need to be completed 5-7 days prior to procedure.   Please have a responsible adult to drive you home after procedure. We advise you have someone to stay with you for 24 hours following procedure for precautionary measures. Depending on procedure you may require an overnight stay.   Cath lab will provide you with all post procedure instructions.     If you have any questions regarding the procedure itself or medications, please call [phone] and ask to speak with a nurse.

## 2024-07-09 DIAGNOSIS — I70.213 ATHEROSCLEROSIS OF NATIVE ARTERY OF BOTH LOWER EXTREMITIES WITH INTERMITTENT CLAUDICATION (HCC): Primary | ICD-10-CM

## 2024-07-09 NOTE — TELEPHONE ENCOUNTER
I called and spoke to Ezra asking him if he wanted to go ahead and schedule his procedure for when he returns from vacation and he stated that he will just call me back beginning of August to schedule.

## 2024-07-10 RX ORDER — CILOSTAZOL 100 MG/1
100 TABLET ORAL 2 TIMES DAILY
Qty: 60 TABLET | Refills: 3 | Status: SHIPPED | OUTPATIENT
Start: 2024-07-10

## 2024-07-25 ENCOUNTER — TELEPHONE (OUTPATIENT)
Dept: CARDIOLOGY CLINIC | Age: 74
End: 2024-07-25

## 2024-07-27 ENCOUNTER — HOSPITAL ENCOUNTER (EMERGENCY)
Age: 74
Discharge: ANOTHER HOSPITAL | End: 2024-07-27
Payer: MEDICARE

## 2024-07-27 VITALS
DIASTOLIC BLOOD PRESSURE: 61 MMHG | SYSTOLIC BLOOD PRESSURE: 133 MMHG | HEART RATE: 96 BPM | TEMPERATURE: 97.88 F | RESPIRATION RATE: 20 BRPM | OXYGEN SATURATION: 95 % | BODY MASS INDEX: 18.1 KG/M2

## 2024-07-27 VITALS — OXYGEN SATURATION: 94 % | SYSTOLIC BLOOD PRESSURE: 92 MMHG | DIASTOLIC BLOOD PRESSURE: 66 MMHG | HEART RATE: 93 BPM

## 2024-07-27 VITALS
OXYGEN SATURATION: 97 % | SYSTOLIC BLOOD PRESSURE: 123 MMHG | RESPIRATION RATE: 18 BRPM | HEART RATE: 85 BPM | DIASTOLIC BLOOD PRESSURE: 66 MMHG | TEMPERATURE: 98.5 F

## 2024-07-27 VITALS — OXYGEN SATURATION: 98 % | HEART RATE: 99 BPM | DIASTOLIC BLOOD PRESSURE: 68 MMHG | SYSTOLIC BLOOD PRESSURE: 145 MMHG
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
## 2024-07-27 DIAGNOSIS — F17.210: ICD-10-CM

## 2024-07-27 DIAGNOSIS — S72.145A: Primary | ICD-10-CM

## 2024-07-27 PROCEDURE — 96374 THER/PROPH/DIAG INJ IV PUSH: CPT

## 2024-07-27 PROCEDURE — 96376 TX/PRO/DX INJ SAME DRUG ADON: CPT

## 2024-07-27 PROCEDURE — 72192 CT PELVIS W/O DYE: CPT

## 2024-07-27 PROCEDURE — 99284 EMERGENCY DEPT VISIT MOD MDM: CPT

## 2024-07-27 PROCEDURE — 73502 X-RAY EXAM HIP UNI 2-3 VIEWS: CPT

## 2024-08-08 NOTE — TELEPHONE ENCOUNTER
Date of Procedure: Thursday 9/12/24 @ [facility] with Dr. Morton     Time of arrival: 9:00 am     Procedure time: 10:30 am     Called and spoke to Ezra and he is agreeable to date and time. Reviewed instructions and he verbalized understanding. Encouraged to call with any questions or concerns.     Published on [employer] and e-mail to Marielle.

## 2024-08-08 NOTE — TELEPHONE ENCOUNTER
Ezra called me this morning stating that he broke his left hip 2 weeks ago and had to have a hip replacement at [facility] in [place]. He is starting rehab at [facility] tomorrow, Friday 8/9. He is wanting to know how soon he can have his peripheral procedure with Dr. Morton? He wants to schedule it now but isn't sure if it's too soon since he just had hip surgery. I told him I would put a message back to Dr. Morton and his RN, he v/u

## 2024-08-09 ENCOUNTER — HOSPITAL ENCOUNTER (OUTPATIENT)
Dept: PHYSICAL THERAPY | Age: 74
Setting detail: THERAPIES SERIES
Discharge: HOME | End: 2024-08-09
Payer: MEDICARE

## 2024-08-09 DIAGNOSIS — Z78.9 DECREASED ACTIVITIES OF DAILY LIVING (ADL): ICD-10-CM

## 2024-08-09 DIAGNOSIS — R68.89 DECREASED STRENGTH, ENDURANCE, AND MOBILITY: ICD-10-CM

## 2024-08-09 DIAGNOSIS — R53.1 GENERALIZED WEAKNESS: Primary | ICD-10-CM

## 2024-08-09 DIAGNOSIS — Z91.81 AT RISK FOR FALLS: ICD-10-CM

## 2024-08-09 DIAGNOSIS — R53.1 DECREASED STRENGTH, ENDURANCE, AND MOBILITY: ICD-10-CM

## 2024-08-09 DIAGNOSIS — Z74.09 DECREASED STRENGTH, ENDURANCE, AND MOBILITY: ICD-10-CM

## 2024-08-09 DIAGNOSIS — R29.898 DECREASED STRENGTH OF LOWER EXTREMITY: ICD-10-CM

## 2024-08-09 DIAGNOSIS — R26.9 GAIT ABNORMALITY: ICD-10-CM

## 2024-08-09 PROCEDURE — 97161 PT EVAL LOW COMPLEX 20 MIN: CPT

## 2024-08-09 PROCEDURE — 97530 THERAPEUTIC ACTIVITIES: CPT

## 2024-08-09 PROCEDURE — 97110 THERAPEUTIC EXERCISES: CPT

## 2024-08-09 NOTE — PLAN OF CARE
(10 visits /OR AUTH LIMITS, whichever is less)  9/6/2024                                             Medical History:  Comorbidities:  Relevant Medical History: Allergic Rhinitis, CALDERON, Dizziness, Loss of appetite, CAD, Anginal equivalent. Atherosclersosis s/p balloon angiplasty, thrombectomy, and stenting of left SFA x 3 on 5/30/24, Tobacco dependence                                         Precautions/ Contra-indications:           Latex allergy:  NO  Pacemaker:    NO  Contraindications for Manipulation: None  Date of Surgery: 7/28/24  Other:    Red Flags:  None    Suicide Screening:   The patient did not verbalize a primary behavioral concern, suicidal ideation, suicidal intent, or demonstrate suicidal behaviors.    Preferred Language for Healthcare:   [x] English       [] other:    SUBJECTIVE EXAMINATION     Patient stated complaint: Pt had a fall at a park.and had an ORIF of his left displaced femur fracture on 7/28/24 at .  Discharged from  hospital on 7/30. Is set to follow-up with the ortho surgeon in [place].  Trying to use the cane as much as possible, even though he knows he's not supposed to, including on his left hand.  Says he only has about 30% blood flow in his right leg.        Test used Initial score  8/9/24 08/09/2024   Pain Summary VAS 3    Functional questionnaire LEFS No time at eval, patient unable to read    Other:              Pain:  Pain location: left hip/leg  Patient describes pain to be dull and aching  Pain decreases with: Sitting and Resting  Patient refuses to take pain meds   Pain increases with: Activity and Movement, Standing, Prolonged standing, Walking, and Prolonged walking     Living status: Lives alone in a 1 story home, 4 steps into home.  Goes to laundry mat, and doesn't use laundry.  Pt still drives.     Occupation/School:  Work/School Status: Retired  Job Duties/Demands: NA    Hand Dominance: Right    Sport/ Recreation/ Leisure/ Hobbies:   Ride motorcycle.     Review

## 2024-08-12 ENCOUNTER — HOSPITAL ENCOUNTER (OUTPATIENT)
Dept: PHYSICAL THERAPY | Age: 74
Setting detail: THERAPIES SERIES
End: 2024-08-12
Payer: MEDICARE

## 2024-08-12 ENCOUNTER — HOSPITAL ENCOUNTER (OUTPATIENT)
Dept: PHYSICAL THERAPY | Age: 74
Setting detail: THERAPIES SERIES
Discharge: HOME | End: 2024-08-12
Payer: MEDICARE

## 2024-08-12 PROCEDURE — 97110 THERAPEUTIC EXERCISES: CPT

## 2024-08-12 PROCEDURE — 97150 GROUP THERAPEUTIC PROCEDURES: CPT

## 2024-08-12 NOTE — FLOWSHEET NOTE
[facility] - Outpatient Rehabilitation and Therapy [address], [place] [address] office: [phone] fax: [phone]        Physical Therapy: TREATMENT/PROGRESS NOTE   Patient: Ezra Gonzalez (74 y.o. male)   Examination Date: 2024   :  1950 MRN: [id number]   Visit #: 2   Insurance Allowable Auth Needed   36/year []Yes    []No    Insurance: Payor: [facility] MEDICARE / Plan: [email or web address] DUAL COMPLETE / Product Type: *No Product type* /   Insurance ID: [id number] - (Medicare Managed)  Secondary Insurance (if applicable): MEDICAID [place]   Treatment Diagnosis:     ICD-10-CM    1. Generalized weakness  R53.1       2. Gait abnormality  R26.9       3. At risk for falls  Z91.81       4. Decreased strength, endurance, and mobility  R53.1     Z74.09     R68.89       5. Decreased strength of lower extremity  R29.898       6. Decreased activities of daily living (ADL)  Z78.9          Medical Diagnosis:  Displaced subtrochanteric fracture of left femur, initial encounter for closed fracture [S72.22XA]   Referring Physician: April Smith APRN -*  PCP: Jazmin Noyola (Inactive)     Plan of care signed (Y/N):     Date of Patient follow up with Physician:  Ortho at  24, Dr. Connell      Plan of Care Report: EVAL today  POC update due: (10 visits /OR AUTH LIMITS, whichever is less)  2024                                             Medical History:  Comorbidities:  Relevant Medical History: Allergic Rhinitis, CALEDRON, Dizziness, Loss of appetite, CAD, Anginal equivalent. Atherosclersosis s/p balloon angiplasty, thrombectomy, and stenting of left SFA x 3 on 24, Tobacco dependence                                         Precautions/ Contra-indications:           Latex allergy:  NO  Pacemaker:    NO  Contraindications for Manipulation: None  Date of Surgery: 24 , WBAT   Other:    Red Flags:  None    Suicide Screening:   The patient did not verbalize a primary behavioral concern,

## 2024-08-15 ENCOUNTER — HOSPITAL ENCOUNTER (OUTPATIENT)
Dept: PHYSICAL THERAPY | Age: 74
Setting detail: THERAPIES SERIES
Discharge: HOME | End: 2024-08-15
Payer: MEDICARE

## 2024-08-15 PROCEDURE — 97110 THERAPEUTIC EXERCISES: CPT | Performed by: SPECIALIST/TECHNOLOGIST

## 2024-08-15 NOTE — FLOWSHEET NOTE
Patient is progressing as expected towards functional goals listed.    [] Progression is slowed due to complexities/Impairments listed.  [] Progression has been slowed due to co-morbidities.  [x] Plan just implemented, too soon (<30days) to assess goals progression   [] Goals require adjustment due to lack of progress  [] Patient is not progressing as expected and requires additional follow up with physician  [] Other:     TREATMENT PLAN     Frequency/Duration: 2-3x/week for 8-10 weeks for the following treatment interventions:    Interventions:  Therapeutic Exercise (26624) including: strength training, ROM, and functional mobility  Therapeutic Activities (78727) including: functional mobility training and education.  Neuromuscular Re-education (41699) activation and proprioception, including postural re-education.    Gait Training (09877) for normalization of ambulation patterns and AD training.   Manual Therapy (97484) as indicated to include: Passive Range of Motion  Modalities as needed that may include: Thermal Agents and Vasoneumatic Compression  Patient education on joint protection, activity modification, and progression of HEP    Plan: POC initiated as per evaluation Balance, gait , improve weight bearing tolerance.     Electronically Signed by Ginny Eldridge, PTA  [id number]   Date: 08/15/2024     Note: Portions of this note have been templated and/or copied from initial evaluation, reassessments and prior notes for documentation efficiency.    Note: If patient does not return for scheduled/recommended follow up visits, this note will serve as a discharge from care along with the most recent update on progress.

## 2024-08-19 ENCOUNTER — HOSPITAL ENCOUNTER (OUTPATIENT)
Dept: PHYSICAL THERAPY | Age: 74
Setting detail: THERAPIES SERIES
Discharge: HOME | End: 2024-08-19
Payer: MEDICARE

## 2024-08-19 PROCEDURE — 97110 THERAPEUTIC EXERCISES: CPT

## 2024-08-19 PROCEDURE — 97530 THERAPEUTIC ACTIVITIES: CPT

## 2024-08-19 NOTE — FLOWSHEET NOTE
5'     IB  2 30''    HSS/ 90/90  2 30'' Monitor LB mechanics      LAQ  Seated march 5#  5# 3  1 10x  10 L    Bridge  Teal TB 3\" 20    SLR Flexion    SL ABD  SL Clams 0# 2 10xea    Bridge Teal TB 2 10    TR   3 10x     Standing Hamstring curls  3# 2 10x                                       Manual Intervention (58207)  TIME                                        NMR re-education (58514) resistance Sets/time Reps CUES NEEDED   Balance tandem  CGA w/ table  10\" 5x    Airex NBOS balance  Airex NBOS balance w/ arm swing  30\"  30\"                          Therapeutic Activity (37446)  Sets/time              Standing hip ext     2   10    Mini squat   1 10           Cone walking in // bars  X 3 laps  Fingertips and no hands   Rockerboard f/b balance  30\"                     Modalities:    No modalities applied this session    Education/Home Exercise Program: Patient HEP program created electronically.  Refer to [employer] access code:      Access Code: [email or web address]  URL: [email or web address]  Date: 08/09/2024  Prepared by: Sandip May    Exercises  - Seated Hamstring Stretch  - 2-3 x daily - 7 x weekly - 1 sets - 3 reps - 20 secs  hold  - Gastroc Stretch on Wall  - 2-3 x daily - 7 x weekly - 1 sets - 3 reps - 20 secs hold  - Heel Raise  - 2 x daily - 7 x weekly - 2-3 sets - 10 reps  - Seated Long Arc Quad  - 2-3 x daily - 7 x weekly - 2-3 sets - 10 reps  - Supine Heel Slide  - 2 x daily - 7 x weekly - 2-3 sets - 10 reps  - Supine Quad Set  - 2-3 x daily - 7 x weekly - 2-3 sets - 10 reps - 5 secs hold  - Supine Hip Adduction Isometric with Ball  - 2-3 x daily - 7 x weekly - 2-3 sets - 10 reps - 5 secs hold      ASSESSMENT     Today's Assessment: Pt with good exercise tolerance today, no pain noted during session. Practiced B foot clearance with small cone step over walking in // bars. Able to maintain equal WB rockerboard balance without use of // bars for support. Cues for form with standing hip ext and mini

## 2024-08-22 ENCOUNTER — HOSPITAL ENCOUNTER (OUTPATIENT)
Dept: PHYSICAL THERAPY | Age: 74
Setting detail: THERAPIES SERIES
Discharge: HOME | End: 2024-08-22
Payer: MEDICARE

## 2024-08-22 PROCEDURE — 97110 THERAPEUTIC EXERCISES: CPT | Performed by: SPECIALIST/TECHNOLOGIST

## 2024-08-22 PROCEDURE — 97530 THERAPEUTIC ACTIVITIES: CPT | Performed by: SPECIALIST/TECHNOLOGIST

## 2024-08-22 NOTE — FLOWSHEET NOTE
[facility] - Outpatient Rehabilitation and Therapy [address], [place] [address] office: [phone] fax: [phone]    Physical Therapy: TREATMENT/PROGRESS NOTE   Patient: Ezra Gonzalez (74 y.o. male)   Examination Date: 2024   :  1950 MRN: [id number]   Visit #: 5   Insurance Allowable Auth Needed   36/year []Yes    []No    Insurance: Payor: [facility] MEDICARE / Plan: [employer] DUAL COMPLETE / Product Type: *No Product type* /   Insurance ID: [id number] - (Medicare Managed)  Secondary Insurance (if applicable): MEDICAID [place]   Treatment Diagnosis:     ICD-10-CM    1. Generalized weakness  R53.1       2. Gait abnormality  R26.9       3. At risk for falls  Z91.81       4. Decreased strength, endurance, and mobility  R53.1     Z74.09     R68.89       5. Decreased strength of lower extremity  R29.898       6. Decreased activities of daily living (ADL)  Z78.9          Medical Diagnosis:  Displaced subtrochanteric fracture of left femur, initial encounter for closed fracture [S72.22XA]   Referring Physician: April Smith APRN -*  PCP: Jazmin Noyola (Inactive)     Plan of care signed (Y/N): Y    Date of Patient follow up with Physician:      Plan of Care Report: NO  POC update due: (10 visits /OR AUTH LIMITS, whichever is less)  2024                                             Medical History:  Comorbidities:  Relevant Medical History: Allergic Rhinitis, CALDERON, Dizziness, Loss of appetite, CAD, Anginal equivalent. Atherosclersosis s/p balloon angiplasty, thrombectomy, and stenting of left SFA x 3 on 24, Tobacco dependence                                         Precautions/ Contra-indications:           Latex allergy:  NO  Pacemaker:    NO  Contraindications for Manipulation: None  Date of Surgery: 24 , WBAT   Other:    Red Flags:  None    Suicide Screening:   The patient did not verbalize a primary behavioral concern, suicidal ideation, suicidal intent, or

## 2024-08-26 ENCOUNTER — HOSPITAL ENCOUNTER (OUTPATIENT)
Dept: PHYSICAL THERAPY | Age: 74
Setting detail: THERAPIES SERIES
Discharge: HOME | End: 2024-08-26
Payer: MEDICARE

## 2024-08-26 ENCOUNTER — APPOINTMENT (OUTPATIENT)
Dept: PHYSICAL THERAPY | Age: 74
End: 2024-08-26
Payer: MEDICARE

## 2024-08-26 PROCEDURE — 97110 THERAPEUTIC EXERCISES: CPT

## 2024-08-26 PROCEDURE — 97530 THERAPEUTIC ACTIVITIES: CPT

## 2024-08-26 NOTE — FLOWSHEET NOTE
without evidence of antalgia to restore PLOF activities and allow him to initiate riding a motorcycle   [] Progressing: [] Met: [] Not Met: [] Adjusted     Overall Progression Towards Functional goals/ Treatment Progress Update:  [] Patient is progressing as expected towards functional goals listed.    [] Progression is slowed due to complexities/Impairments listed.  [] Progression has been slowed due to co-morbidities.  [x] Plan just implemented, too soon (<30days) to assess goals progression   [] Goals require adjustment due to lack of progress  [] Patient is not progressing as expected and requires additional follow up with physician  [] Other:     TREATMENT PLAN     Frequency/Duration: 2-3x/week for 8-10 weeks for the following treatment interventions:    Interventions:  Therapeutic Exercise (71724) including: strength training, ROM, and functional mobility  Therapeutic Activities (14177) including: functional mobility training and education.  Neuromuscular Re-education (79852) activation and proprioception, including postural re-education.    Gait Training (35142) for normalization of ambulation patterns and AD training.   Manual Therapy (97255) as indicated to include: Passive Range of Motion  Modalities as needed that may include: Thermal Agents and Vasoneumatic Compression  Patient education on joint protection, activity modification, and progression of HEP    Plan: POC initiated as per evaluation Balance, gait , improve weight bearing tolerance. Focus on normalize gait pattern.  [employer] ok on sat but only machines he's started here with. Advised  Minimal TM use presently due to poor strength/ endurance     Electronically Signed by Catrina Rivera PTA [id number] Date: 08/26/2024     Note: Portions of this note have been templated and/or copied from initial evaluation, reassessments and prior notes for documentation efficiency.    Note: If patient does not return for scheduled/recommended follow up visits, this note will  serve as a discharge from care along with the most recent update on progress.

## 2024-08-29 ENCOUNTER — APPOINTMENT (OUTPATIENT)
Dept: PHYSICAL THERAPY | Age: 74
End: 2024-08-29
Payer: MEDICARE

## 2024-09-03 ENCOUNTER — HOSPITAL ENCOUNTER (OUTPATIENT)
Dept: PHYSICAL THERAPY | Age: 74
Setting detail: THERAPIES SERIES
Discharge: HOME | End: 2024-09-03
Payer: MEDICARE

## 2024-09-03 PROCEDURE — 97530 THERAPEUTIC ACTIVITIES: CPT

## 2024-09-03 PROCEDURE — 97110 THERAPEUTIC EXERCISES: CPT

## 2024-09-03 NOTE — FLOWSHEET NOTE
[facility] - Outpatient Rehabilitation and Therapy [address], [place] [address] office: [phone] fax: [phone]    Physical Therapy: TREATMENT/PROGRESS NOTE   Patient: Ezra Gonzalez (74 y.o. male)   Examination Date: 2024   :  1950 MRN: [id number]   Visit #: 7   Insurance Allowable Auth Needed   36/year []Yes    []No    Insurance: Payor: [facility] MEDICARE / Plan: [employer] DUAL COMPLETE / Product Type: *No Product type* /   Insurance ID: [id number] - (Medicare Managed)  Secondary Insurance (if applicable): MEDICAID [place]   Treatment Diagnosis:     ICD-10-CM    1. Generalized weakness  R53.1       2. Gait abnormality  R26.9       3. At risk for falls  Z91.81       4. Decreased strength, endurance, and mobility  R53.1     Z74.09     R68.89       5. Decreased strength of lower extremity  R29.898       6. Decreased activities of daily living (ADL)  Z78.9          Medical Diagnosis:  Displaced subtrochanteric fracture of left femur, initial encounter for closed fracture [S72.22XA]   Referring Physician: April Smith APRN -*  PCP: Jazmin Noyola (Inactive)     Plan of care signed (Y/N): Y    Date of Patient follow up with Physician:      Plan of Care Report: NO  POC update due: (10 visits /OR AUTH LIMITS, whichever is less)  2024                                             Medical History:  Comorbidities:  Relevant Medical History: Allergic Rhinitis, CALDERON, Dizziness, Loss of appetite, CAD, Anginal equivalent. Atherosclersosis s/p balloon angiplasty, thrombectomy, and stenting of left SFA x 3 on 24, Tobacco dependence                                         Precautions/ Contra-indications:           Latex allergy:  NO  Pacemaker:    NO  Contraindications for Manipulation: None  Date of Surgery: 24 , WBAT   Other:    Red Flags:  None    Suicide Screening:   The patient did not verbalize a primary behavioral concern, suicidal ideation, suicidal intent, or

## 2024-09-05 ENCOUNTER — APPOINTMENT (OUTPATIENT)
Dept: PHYSICAL THERAPY | Age: 74
End: 2024-09-05
Payer: MEDICARE

## 2024-09-09 RX ORDER — CLOPIDOGREL BISULFATE 75 MG/1
75 TABLET ORAL DAILY
Qty: 30 TABLET | Refills: 3 | OUTPATIENT
Start: 2024-09-09

## 2024-09-09 NOTE — TELEPHONE ENCOUNTER
Pt is not on this any longer for CAD, but is on for PAD. This should be filled by Dr Corrales going forward.

## 2024-09-10 ENCOUNTER — HOSPITAL ENCOUNTER (OUTPATIENT)
Dept: PHYSICAL THERAPY | Age: 74
Setting detail: THERAPIES SERIES
Discharge: HOME | End: 2024-09-10
Payer: MEDICARE

## 2024-09-10 PROCEDURE — 97110 THERAPEUTIC EXERCISES: CPT

## 2024-09-10 PROCEDURE — 97530 THERAPEUTIC ACTIVITIES: CPT

## 2024-09-10 PROCEDURE — 97116 GAIT TRAINING THERAPY: CPT

## 2024-09-11 RX ORDER — METOPROLOL SUCCINATE 25 MG/1
25 TABLET, EXTENDED RELEASE ORAL DAILY
Qty: 90 TABLET | Refills: 3 | Status: SHIPPED | OUTPATIENT
Start: 2024-09-11

## 2024-09-11 RX ORDER — CLOPIDOGREL BISULFATE 75 MG/1
75 TABLET ORAL DAILY
Qty: 30 TABLET | Refills: 3 | OUTPATIENT
Start: 2024-09-11

## 2024-09-12 RX ORDER — CLOPIDOGREL BISULFATE 75 MG/1
75 TABLET ORAL DAILY
Qty: 30 TABLET | Refills: 3 | Status: SHIPPED | OUTPATIENT
Start: 2024-09-12

## 2024-09-13 ENCOUNTER — HOSPITAL ENCOUNTER (OUTPATIENT)
Dept: PHYSICAL THERAPY | Age: 74
Setting detail: THERAPIES SERIES
Discharge: HOME | End: 2024-09-13
Payer: MEDICARE

## 2024-09-13 PROCEDURE — 97110 THERAPEUTIC EXERCISES: CPT

## 2024-09-24 ENCOUNTER — HOSPITAL ENCOUNTER (OUTPATIENT)
Age: 74
Discharge: HOME | End: 2024-09-24
Payer: MEDICARE

## 2024-09-24 DIAGNOSIS — I73.9 PAD (PERIPHERAL ARTERY DISEASE) (HCC): ICD-10-CM

## 2024-09-24 DIAGNOSIS — R93.6 ABNORMAL FINDINGS ON DIAGNOSTIC IMAGING OF LIMBS: ICD-10-CM

## 2024-09-24 LAB
ANION GAP SERPL CALCULATED.3IONS-SCNC: 7 MMOL/L (ref 3–16)
BUN SERPL-MCNC: 9 MG/DL (ref 7–20)
CALCIUM SERPL-MCNC: 9 MG/DL (ref 8.3–10.6)
CHLORIDE SERPL-SCNC: 102 MMOL/L (ref 99–110)
CO2 SERPL-SCNC: 28 MMOL/L (ref 21–32)
CREAT SERPL-MCNC: 0.6 MG/DL (ref 0.8–1.3)
DEPRECATED RDW RBC AUTO: 18.1 % (ref 12.4–15.4)
GFR SERPLBLD CREATININE-BSD FMLA CKD-EPI: >90 ML/MIN/{1.73_M2}
GLUCOSE SERPL-MCNC: 94 MG/DL (ref 70–99)
HCT VFR BLD AUTO: 35.1 % (ref 40.5–52.5)
HGB BLD-MCNC: 11.7 G/DL (ref 13.5–17.5)
MCH RBC QN AUTO: 27.6 PG (ref 26–34)
MCHC RBC AUTO-ENTMCNC: 33.2 G/DL (ref 31–36)
MCV RBC AUTO: 83.3 FL (ref 80–100)
PLATELET # BLD AUTO: 576 K/UL (ref 135–450)
PMV BLD AUTO: 7.4 FL (ref 5–10.5)
POTASSIUM SERPL-SCNC: 3.8 MMOL/L (ref 3.5–5.1)
RBC # BLD AUTO: 4.22 M/UL (ref 4.2–5.9)
SODIUM SERPL-SCNC: 137 MMOL/L (ref 136–145)
WBC # BLD AUTO: 4.3 K/UL (ref 4–11)

## 2024-09-24 PROCEDURE — 85027 COMPLETE CBC AUTOMATED: CPT

## 2024-09-24 PROCEDURE — 80048 BASIC METABOLIC PNL TOTAL CA: CPT

## 2024-10-03 ENCOUNTER — HOSPITAL ENCOUNTER (OUTPATIENT)
Age: 74
Setting detail: OUTPATIENT SURGERY
Discharge: HOME | End: 2024-10-03
Attending: INTERNAL MEDICINE | Admitting: INTERNAL MEDICINE
Payer: MEDICARE

## 2024-10-03 VITALS
OXYGEN SATURATION: 99 % | WEIGHT: 136 LBS | HEIGHT: 69 IN | RESPIRATION RATE: 16 BRPM | SYSTOLIC BLOOD PRESSURE: 150 MMHG | BODY MASS INDEX: 20.14 KG/M2 | DIASTOLIC BLOOD PRESSURE: 65 MMHG | TEMPERATURE: 97.8 F | HEART RATE: 59 BPM

## 2024-10-03 DIAGNOSIS — I73.9 PAD (PERIPHERAL ARTERY DISEASE) (HCC): ICD-10-CM

## 2024-10-03 LAB — ECHO BSA: 1.73 M2

## 2024-10-03 PROCEDURE — C1725 CATH, TRANSLUMIN NON-LASER: HCPCS | Performed by: INTERNAL MEDICINE

## 2024-10-03 PROCEDURE — 75716 ARTERY X-RAYS ARMS/LEGS: CPT | Performed by: INTERNAL MEDICINE

## 2024-10-03 PROCEDURE — 6360000002 HC RX W HCPCS: Performed by: INTERNAL MEDICINE

## 2024-10-03 PROCEDURE — 85347 COAGULATION TIME ACTIVATED: CPT

## 2024-10-03 PROCEDURE — 2580000003 HC RX 258: Performed by: INTERNAL MEDICINE

## 2024-10-03 PROCEDURE — C1876 STENT, NON-COA/NON-COV W/DEL: HCPCS | Performed by: INTERNAL MEDICINE

## 2024-10-03 PROCEDURE — 99153 MOD SED SAME PHYS/QHP EA: CPT | Performed by: INTERNAL MEDICINE

## 2024-10-03 PROCEDURE — 99152 MOD SED SAME PHYS/QHP 5/>YRS: CPT | Performed by: INTERNAL MEDICINE

## 2024-10-03 PROCEDURE — C1887 CATHETER, GUIDING: HCPCS | Performed by: INTERNAL MEDICINE

## 2024-10-03 PROCEDURE — 2709999900 HC NON-CHARGEABLE SUPPLY: Performed by: INTERNAL MEDICINE

## 2024-10-03 PROCEDURE — 2500000003 HC RX 250 WO HCPCS: Performed by: INTERNAL MEDICINE

## 2024-10-03 PROCEDURE — C1769 GUIDE WIRE: HCPCS | Performed by: INTERNAL MEDICINE

## 2024-10-03 PROCEDURE — 37226 HC FEM POP TERR PLASTY AND STENT: CPT | Performed by: INTERNAL MEDICINE

## 2024-10-03 PROCEDURE — 7100000010 HC PHASE II RECOVERY - FIRST 15 MIN: Performed by: INTERNAL MEDICINE

## 2024-10-03 PROCEDURE — C1894 INTRO/SHEATH, NON-LASER: HCPCS | Performed by: INTERNAL MEDICINE

## 2024-10-03 PROCEDURE — 7100000011 HC PHASE II RECOVERY - ADDTL 15 MIN: Performed by: INTERNAL MEDICINE

## 2024-10-03 RX ORDER — MIDAZOLAM HYDROCHLORIDE 1 MG/ML
INJECTION INTRAMUSCULAR; INTRAVENOUS PRN
Status: DISCONTINUED | OUTPATIENT
Start: 2024-10-03 | End: 2024-10-03 | Stop reason: HOSPADM

## 2024-10-03 RX ORDER — SODIUM CHLORIDE 9 MG/ML
INJECTION, SOLUTION INTRAVENOUS PRN
Status: DISCONTINUED | OUTPATIENT
Start: 2024-10-03 | End: 2024-10-03 | Stop reason: HOSPADM

## 2024-10-03 RX ORDER — SODIUM CHLORIDE 0.9 % (FLUSH) 0.9 %
5-40 SYRINGE (ML) INJECTION EVERY 12 HOURS SCHEDULED
Status: DISCONTINUED | OUTPATIENT
Start: 2024-10-03 | End: 2024-10-03 | Stop reason: HOSPADM

## 2024-10-03 RX ORDER — MULTIVIT WITH MINERALS/LUTEIN
1000 TABLET ORAL DAILY
COMMUNITY

## 2024-10-03 RX ORDER — FENTANYL CITRATE 50 UG/ML
INJECTION, SOLUTION INTRAMUSCULAR; INTRAVENOUS PRN
Status: DISCONTINUED | OUTPATIENT
Start: 2024-10-03 | End: 2024-10-03 | Stop reason: HOSPADM

## 2024-10-03 RX ORDER — HEPARIN SODIUM 1000 [USP'U]/ML
INJECTION, SOLUTION INTRAVENOUS; SUBCUTANEOUS PRN
Status: DISCONTINUED | OUTPATIENT
Start: 2024-10-03 | End: 2024-10-03 | Stop reason: HOSPADM

## 2024-10-03 RX ORDER — SODIUM CHLORIDE 0.9 % (FLUSH) 0.9 %
5-40 SYRINGE (ML) INJECTION PRN
Status: DISCONTINUED | OUTPATIENT
Start: 2024-10-03 | End: 2024-10-03 | Stop reason: HOSPADM

## 2024-10-03 RX ORDER — LIDOCAINE HYDROCHLORIDE 10 MG/ML
INJECTION, SOLUTION INFILTRATION; PERINEURAL PRN
Status: DISCONTINUED | OUTPATIENT
Start: 2024-10-03 | End: 2024-10-03 | Stop reason: HOSPADM

## 2024-10-03 RX ORDER — ACETAMINOPHEN 325 MG/1
650 TABLET ORAL EVERY 4 HOURS PRN
Status: DISCONTINUED | OUTPATIENT
Start: 2024-10-03 | End: 2024-10-03 | Stop reason: HOSPADM

## 2024-10-03 RX ADMIN — SODIUM CHLORIDE: 9 INJECTION, SOLUTION INTRAVENOUS at 07:36

## 2024-10-03 RX ADMIN — Medication 10 ML: at 07:36

## 2024-10-03 NOTE — FLOWSHEET NOTE
Radial compression band removed from left wrist without difficulty. No complications noted to cath site; gauze, tegaderm dressing and coban applied. Education provided to patient in regards to post cath restrictions. All questions answered. Patient verbalized understanding. Will monitor.     Electronically signed by Monik Elmore RN on 10/3/2024 at 12:54 PM

## 2024-10-03 NOTE — FLOWSHEET NOTE
Patient received post procedure in stable condition.   Post-cath handoff/site assessment performed to left wrist.  TR band on and in place without complication.   Pt is awake and alert. No distress noted. VSS-see flowsheet.   Post-cath restrictions/instructions provided  Patient provided with snack/drink.   No further needs at this time, call light within reach.   MD to talk to patient soon.     Electronically signed by Monik Elmore RN on 10/3/2024 at 11:07 AM

## 2024-10-03 NOTE — FLOWSHEET NOTE
Discharge instructions reviewed with patient. Medications and follow-up appointment(s) reviewed.  All questions answered at present time. Patient verbalized understanding.  Left wrist cath site remains free from complication. PIV removed; dressing applied to site.     Patient's story on who is going to take him home is very inconsistent. RN overheard patient on the phone telling someone that he would be fine taking himself home. Patient reminded that he needs someone to take him home (this was also discussed prior to his procedure) and that he cannot drive for 24 hours following his procedure. Patient stated understanding but to unable verify when or if someone will be here to get him. Patient's behavior very suspicious. RN concerned that the patient will drive himself home. Patient informed he can either wait for ride or sign out against medical advice if he was not willing to wait. Patient requested an AMA form. AMA form signed and witnessed.     Patient ambulated off unit (declined wheelchair) with belongings.      Electronically signed by Monik Elmore RN on 10/3/2024 at 2:51 PM

## 2024-10-03 NOTE — DISCHARGE INSTRUCTIONS
PERIPHERAL ANGIOGRAM    Care of your puncture site:  Remove bandage 24 hours after the procedure.  May shower in 24 hours but do not sit in a bathtub/pool of water for 5 days or until the wound is healed.  Gently clean groin using soap and water.  Dry thoroughly and apply a Band-Aid that covers the entire site. Use Band-Aid until skin heals over in about 3-5 days.  Do not apply powder or lotion.      Normal Observations:  Soreness or tenderness which may last one week.  Possible bruising that could last 2 weeks.    Activity:  You may resume driving 24 hours following the procedure.  Do not make important / legal decisions within 24 hours after procedure.  You may resume normal activity in 5 days or after the wound heals.  Avoid lifting more than 10 pounds for 5 days or until the wound heals.  Avoid strenuous exercise or activity for 1 week.      Nutrition:  Regular diet  Drink at least 8 to 10 glasses of decaffeinated, non-alcoholic fluid for the next 24 hours to flush the x-ray dye used for your angiogram out of your body.    Call your doctor immediately if your condition worsens, for any other concerns, for a follow-up appointment or if you experience any of the following:  Increased swelling on the groin or leg.  Unusual pain, numbness, or tingling of the groin or down the leg.  Any signs of infection such as: redness, yellow drainage at the site, swelling or pain.    IF GROIN STARTS BLEEDING SIGNIFICANTLY:   LAY FLAT, HOLD FIRM DIRECT PRESSURE, AND CALL 911    Other Instructions:  Hold Metformin or Metformin containing drugs for 48 hours after procedure if applicable.

## 2024-10-03 NOTE — H&P
H&P Update -                     Interventional Cardiology Consultation      Ezra Gonzalez  1950     PCP: Jazmin Noyola (Inactive)  Referring Physician: Dr. Au  Reason for Referral: PAD  Chief Complaint: \"I FEEL GREAT\"       Chief Complaint   Patient presents with    Follow-up       NO CC         Subjective:   History of Present Illness: The patient is 74 y.o. male with a past medical history significant for CAD and HTN. Patient follows with Dr. Au for primary cardiology. History of peripheral artery disease with lifestyle limiting claudication. Previously followed with Dr. Mccann. Patient s/p successful balloon angioplasty, throbmectomy and stenting of the left SFA ( X 3) 5/30/24. Patient here today for follow up. Reports overall he is feeling great. States he is staying active. Goes to the [facility] and walks on the treadmill. Denies any new or worsening claudication of the left leg. Reports intermittent claudication of the right leg.  Denies any wounds.         Past Medical History        Past Medical History:   Diagnosis Date    CAD (coronary artery disease)      Glaucoma      Hypertension           Past Surgical History         Past Surgical History:   Procedure Laterality Date    ARM SURGERY        CARDIAC PROCEDURE Left 5/30/2024     Peripheral angiography performed by Minesh Morton MD at [facility] CARDIAC CATH LAB    CATARACT EXTRACTION        DIAGNOSTIC CARDIAC CATH LAB PROCEDURE             Family History         Family History   Problem Relation Age of Onset    Cancer Father           Social History   Social History            Tobacco Use    Smoking status: Some Days       Current packs/day: 0.25       Average packs/day: 1 pack/day for 55.6 years (55.2 ttl pk-yrs)       Types: Cigarettes       Start date: 12/1/2023    Smokeless tobacco: Never    Tobacco comments:       Trying to quit. Smokes 2-3 cigarettes daily.   Vaping Use    Vaping Use: Never used   Substance Use Topics    Alcohol use: Yes       Comment:  \"PTINR\", \"TROPONINI\", \"LABA1C\"   No results found for: \"CBCAUTODIF\"     EKG Interpretation:      Echo 6/15/2023  Summary   -Normal left ventricle size, mild to moderate concentric wall thickness, and   normal systolic function with an estimated ejection fraction of 55%.   -There is mild to moderate hypokinesis of the basal inferoseptal walls with   aneurysmal defect.   -Indeterminate diastolic function.E/e\"=10.35.   -GLS-15.1% (abnormal)   -Mitral annular calcification is present.   -Calcification of the mitral valve noted.   -Mild mitral regurgitation.   -The aortic valve is mildly thickened/calcified but opens well with normal   gradients and trivial regurgitation.   -Estimated pulmonary artery systolic pressure is normal at 24 mmHg assuming   a right atrial pressure of 3 mmHg.     Stress Test 12/26/2023   Summary   Small-medium sized inferior partial reversibility defect of moderate   intensity consistent with ischemia and infarction in the territory of the   mid and distal LCx and/or RCA .     Cath 1/12/2024 (Dr. Correa)  INTERVENTION:  Lesion 1, PCI of LAD:  FFR OF LAD 0.76, DFR 0.76  ~Guide catheter: Accessing and selectively catheterizing with the xb 3.5  ~Coronary wire: Traversing the lesion with a comet wire  ~Additional Equipment: Guideliner  ~Pre-dilation Balloon: 2.0x20 balloon  ~Drug Eluting Stent: 3.0x48 synergy, 2.5x16 synergy  ~Post-dilation Balloon: 3.25x12 NC  Results of Intervention   Pre - 80% stenosis, JAYCE 3 flow  Post - 0% stenosis, JAYCE 3 flow     Peripheral 2/5/2024 (Dr. Mccann)  Procedure: 1) Ultrasound guided access to the Right   common femoral artery.  2)  Abdominal aortogram with Bilateral lower extremity runoffs            3)  2nd order LLE angiogram for improved distal visualization       Peripheral 5/30/24    100% occluded left superficial femoral artery    S/p successful balloon angioplasty, throbmectomy and stenting of the left SFA ( X 3)    Aggressive medical therapy for PAD

## 2024-10-04 LAB — POC ACT LR: >400 SEC

## 2024-10-09 LAB — ECHO BSA: 1.73 M2

## 2024-10-21 ENCOUNTER — TELEPHONE (OUTPATIENT)
Dept: CARDIOLOGY CLINIC | Age: 74
End: 2024-10-21

## 2024-10-21 NOTE — TELEPHONE ENCOUNTER
Spoke with patient he stated he didn't want to cancel his stress test now. He will call back in a couple weeks if he feels like he can't do the test.

## 2024-10-21 NOTE — TELEPHONE ENCOUNTER
Pt states he is recovering from hip sx, and he needs to know how fast he will need to go on the treadmill.    He wants to make sure he does not waste WAK's time by chancing not being able to finish the test.    Please advise.

## 2024-10-21 NOTE — TELEPHONE ENCOUNTER
Per Vandana note, please call pt to advise to cancel the GXT. Please assess for any chest discomfort and we will change the order. Thank you!

## 2024-10-30 ENCOUNTER — APPOINTMENT (OUTPATIENT)
Dept: PHYSICAL THERAPY | Age: 74
End: 2024-10-30
Payer: MEDICARE

## 2024-10-31 ENCOUNTER — HOSPITAL ENCOUNTER (OUTPATIENT)
Dept: PHYSICAL THERAPY | Age: 74
Setting detail: THERAPIES SERIES
Discharge: HOME | End: 2024-10-31
Payer: MEDICARE

## 2024-10-31 LAB — ECHO BSA: 1.73 M2

## 2024-10-31 PROCEDURE — 97164 PT RE-EVAL EST PLAN CARE: CPT

## 2024-10-31 PROCEDURE — 97110 THERAPEUTIC EXERCISES: CPT

## 2024-10-31 NOTE — PLAN OF CARE
[facility] [address], [place] [address] office: [phone] fax: [phone]      Physical Therapy Re-Certification Plan of Care    Dear GERALD Bae - *  ,    We had the pleasure of treating the following patient for physical therapy services at [facility]. A summary of our findings can be found in the updated assessment below.  This includes our plan of care.  If you have any questions or concerns regarding these findings, please do not hesitate to contact me at the office phone number checked above.  Thank you for the referral.     Physician Signature:________________________________Date:__________________  By signing above (or electronic signature), therapist's plan is approved by physician      Functional Outcome: LUIS MIGUEL Gonzalez 1950 continues to present with functional deficits in strength symmetry, endurance of strength, eccentric control, and muscle activation  limiting ability with walking on uneven ground, navigate curbs/steps, heavy home activity, and yardwork .  During therapy this date, patient required progression of exercises and program for exercise progression. Patient will continue to benefit from ongoing evaluation and advanced clinical decision from a Physical Therapist to improve muscle strength, endurance, normalization of gait, balance and proprioception, and ADL status to safely return to PLOF without symptoms or restrictions.    Overall Response to Treatment:  Patient's progress has stalled due to taking about 6 weeks off of therapy and limited HEP compliance during this time. His motion looks great overall, but has extremely limited L hip strength lending to gait abnormalities such as Trendelenberg and decreased gait speed. Over next 4-6 weeks of therapy should address hip strength and muscular endurance as well as gait training to promote safe return to ADLs.     Total

## 2024-11-01 ENCOUNTER — CLINICAL DOCUMENTATION (OUTPATIENT)
Dept: CARDIOLOGY CLINIC | Age: 74
End: 2024-11-01

## 2024-11-01 ENCOUNTER — HOSPITAL ENCOUNTER (EMERGENCY)
Age: 74
Discharge: HOME | End: 2024-11-01
Attending: EMERGENCY MEDICINE
Payer: MEDICARE

## 2024-11-01 ENCOUNTER — TELEPHONE (OUTPATIENT)
Dept: CARDIOLOGY CLINIC | Age: 74
End: 2024-11-01

## 2024-11-01 VITALS
OXYGEN SATURATION: 100 % | HEIGHT: 69 IN | DIASTOLIC BLOOD PRESSURE: 72 MMHG | TEMPERATURE: 98.7 F | BODY MASS INDEX: 19.99 KG/M2 | WEIGHT: 135 LBS | SYSTOLIC BLOOD PRESSURE: 122 MMHG | RESPIRATION RATE: 16 BRPM | HEART RATE: 66 BPM

## 2024-11-01 DIAGNOSIS — R04.0 ACUTE POSTERIOR EPISTAXIS: Primary | ICD-10-CM

## 2024-11-01 LAB
ANION GAP SERPL CALCULATED.3IONS-SCNC: 9 MMOL/L (ref 3–16)
APTT BLD: 28.9 SEC (ref 22.1–36.4)
BASOPHILS # BLD: 0 K/UL (ref 0–0.2)
BASOPHILS NFR BLD: 1.2 %
BUN SERPL-MCNC: 12 MG/DL (ref 7–20)
CALCIUM SERPL-MCNC: 9.1 MG/DL (ref 8.3–10.6)
CHLORIDE SERPL-SCNC: 103 MMOL/L (ref 99–110)
CO2 SERPL-SCNC: 27 MMOL/L (ref 21–32)
CREAT SERPL-MCNC: 0.6 MG/DL (ref 0.8–1.3)
DEPRECATED RDW RBC AUTO: 18.7 % (ref 12.4–15.4)
EOSINOPHIL # BLD: 0.1 K/UL (ref 0–0.6)
EOSINOPHIL NFR BLD: 1.6 %
GFR SERPLBLD CREATININE-BSD FMLA CKD-EPI: >90 ML/MIN/{1.73_M2}
GLUCOSE SERPL-MCNC: 102 MG/DL (ref 70–99)
HCT VFR BLD AUTO: 33.8 % (ref 40.5–52.5)
HGB BLD-MCNC: 11.3 G/DL (ref 13.5–17.5)
INR PPP: 1.05 (ref 0.85–1.15)
LYMPHOCYTES # BLD: 0.9 K/UL (ref 1–5.1)
LYMPHOCYTES NFR BLD: 22.8 %
MCH RBC QN AUTO: 27.5 PG (ref 26–34)
MCHC RBC AUTO-ENTMCNC: 33.6 G/DL (ref 31–36)
MCV RBC AUTO: 82 FL (ref 80–100)
MONOCYTES # BLD: 0.3 K/UL (ref 0–1.3)
MONOCYTES NFR BLD: 8.1 %
NEUTROPHILS # BLD: 2.8 K/UL (ref 1.7–7.7)
NEUTROPHILS NFR BLD: 66.3 %
PLATELET # BLD AUTO: 555 K/UL (ref 135–450)
PMV BLD AUTO: 7.9 FL (ref 5–10.5)
POTASSIUM SERPL-SCNC: 4.2 MMOL/L (ref 3.5–5.1)
PROTHROMBIN TIME: 13.9 SEC (ref 11.9–14.9)
RBC # BLD AUTO: 4.12 M/UL (ref 4.2–5.9)
SODIUM SERPL-SCNC: 139 MMOL/L (ref 136–145)
WBC # BLD AUTO: 4.2 K/UL (ref 4–11)

## 2024-11-01 PROCEDURE — 99283 EMERGENCY DEPT VISIT LOW MDM: CPT

## 2024-11-01 PROCEDURE — 85025 COMPLETE CBC W/AUTO DIFF WBC: CPT

## 2024-11-01 PROCEDURE — 30905 CONTROL OF NOSEBLEED: CPT

## 2024-11-01 PROCEDURE — 85730 THROMBOPLASTIN TIME PARTIAL: CPT

## 2024-11-01 PROCEDURE — 2500000003 HC RX 250 WO HCPCS

## 2024-11-01 PROCEDURE — 6370000000 HC RX 637 (ALT 250 FOR IP): Performed by: EMERGENCY MEDICINE

## 2024-11-01 PROCEDURE — 80048 BASIC METABOLIC PNL TOTAL CA: CPT

## 2024-11-01 PROCEDURE — 85610 PROTHROMBIN TIME: CPT

## 2024-11-01 RX ORDER — ONDANSETRON 4 MG/1
4 TABLET, ORALLY DISINTEGRATING ORAL ONCE
Status: COMPLETED | OUTPATIENT
Start: 2024-11-01 | End: 2024-11-01

## 2024-11-01 RX ORDER — OXYCODONE AND ACETAMINOPHEN 5; 325 MG/1; MG/1
1 TABLET ORAL ONCE
Status: COMPLETED | OUTPATIENT
Start: 2024-11-01 | End: 2024-11-01

## 2024-11-01 RX ORDER — OXYMETAZOLINE HYDROCHLORIDE 0.05 G/100ML
2 SPRAY NASAL ONCE
Status: COMPLETED | OUTPATIENT
Start: 2024-11-01 | End: 2024-11-01

## 2024-11-01 RX ORDER — TRANEXAMIC ACID 100 MG/ML
INJECTION, SOLUTION INTRAVENOUS
Status: COMPLETED
Start: 2024-11-01 | End: 2024-11-01

## 2024-11-01 RX ORDER — CEPHALEXIN 500 MG/1
500 CAPSULE ORAL 3 TIMES DAILY
Qty: 15 CAPSULE | Refills: 0 | Status: SHIPPED | OUTPATIENT
Start: 2024-11-01 | End: 2024-11-06

## 2024-11-01 RX ORDER — OXYMETAZOLINE HYDROCHLORIDE 0.05 G/100ML
SPRAY NASAL
Status: DISCONTINUED
Start: 2024-11-01 | End: 2024-11-01 | Stop reason: HOSPADM

## 2024-11-01 RX ORDER — ACETAMINOPHEN 325 MG/1
325 TABLET ORAL ONCE
Status: COMPLETED | OUTPATIENT
Start: 2024-11-01 | End: 2024-11-01

## 2024-11-01 RX ADMIN — OXYMETAZOLINE HYDROCHLORIDE 2 SPRAY: 5 SPRAY NASAL at 11:25

## 2024-11-01 RX ADMIN — ONDANSETRON 4 MG: 4 TABLET, ORALLY DISINTEGRATING ORAL at 14:01

## 2024-11-01 RX ADMIN — ACETAMINOPHEN 325 MG: 325 TABLET ORAL at 14:01

## 2024-11-01 RX ADMIN — OXYCODONE HYDROCHLORIDE AND ACETAMINOPHEN 1 TABLET: 5; 325 TABLET ORAL at 14:01

## 2024-11-01 RX ADMIN — TRANEXAMIC ACID: 1 INJECTION, SOLUTION INTRAVENOUS at 12:35

## 2024-11-01 ASSESSMENT — PAIN DESCRIPTION - ORIENTATION: ORIENTATION: LEFT

## 2024-11-01 ASSESSMENT — PAIN SCALES - GENERAL
PAINLEVEL_OUTOF10: 10
PAINLEVEL_OUTOF10: 8
PAINLEVEL_OUTOF10: 0
PAINLEVEL_OUTOF10: 10

## 2024-11-01 ASSESSMENT — PAIN DESCRIPTION - LOCATION
LOCATION: NOSE
LOCATION: NOSE

## 2024-11-01 NOTE — TELEPHONE ENCOUNTER
Pt called to ask WALLACE if he should stop his blood thinner, Pt is presently in the ER.  I spoke w/WALLACE and he advise for the Pt to follow the direction of the ER Physician since he is not physically with him re: his concerns.  FRANCIE

## 2024-11-01 NOTE — PROGRESS NOTES
Patient walked into office complaining of severe nose bleed and was actively coughing up blood  Large amount of blood and clot on gauze  Bleeding appeared to have stopped  Patient known to be on aspirin and plavix    Given acute onset of symptoms and amount of blood, I advised that patient go to ER - 911 was called, we requested that EMS take patient to [place] in order to have evaluation and be seen by ENT.    I called [place] ER and gave them report.

## 2024-11-01 NOTE — ED NOTES
Shortly after MD applied more air into the rapid rhino, Pt called out stating that he had a gush of blood.  MD made aware and given okay to take a small amount of air out of the rapid rhino.  MD stated that he had 8 ml of air in the rapid rhino and stated to RN to remove 2 ml.

## 2024-11-01 NOTE — TELEPHONE ENCOUNTER
Pt called stating he has a severe nose bleed at 8:22am, Pt called back again, I send in a geoffrey priority and called the MA room to handle the call.  FRANCIE

## 2024-11-01 NOTE — ED NOTES
MD at bedside, Pt said, \"I didn't realize you were not ENT doctor otherwise I wouldn't let you do this to my face.\"  MD verbalized and reiterated to Pt that he was an emergency MD and Pt angrily stated that RN told him that ENT was coming in.  This RN informed Pt that at no time did he say that ENT was coming, but that the MD would call ENT, and to which MD reiterated to the Pt that he cannot force specialist doctors to come to the emergency room.  MD informed Pt of his rights that if he would like to leave then that is up to him and that he's going to speak to ENT to find out what their recommended treatment options would be.  RN informed Pt that we deal with people who are on blood thinners and having this similar issue frequently in the ER.  Pt did not seem receptive to what MD and RN were stating to him.  When MD offered Pt something for pain to make him comfortable, Pt verbalized that he wanted to hold off on taking anything for pain at this time.

## 2024-11-01 NOTE — TELEPHONE ENCOUNTER
Pt called back and stated, he is having a really bad nose bleed. He wants to talk to RN and want to come to see WAK.  Please advise  Thank you

## 2024-11-01 NOTE — ED NOTES
RN rounded on Pt, Pt verbalizing that he is in an extreme amount of discomfort, \"I don't take pain killers for broken hips, but this right here is an intense amount of pain, why couldn't I get something for pain when he did this?\"  Pt referring to his rapid rhino in the left nostril.  RN empathized with Pt, but Pt still very showing a great amount of displeasure.  RN informed Pt that he would find MD to inquire about easing up Pt's pain and Pt verbalized understanding.  RN unable to find MD at this time, sent message on comment board, and asked [facility] ROMAN Bañuelos if she sees MD inquire about something for pain for Pt.     Pt asked RN if he could just take out his rapid rhino and RN advised Pt that it would not be proctor to due to the fact that the balloon is inflated and pulling it out would cause more damage and trauma.  Pt verbalized frustration stating, \"I've been bleeding since 0940 hrs, I'm going to choke and die while I sit here.\"  RN stated to Pt that MD and he would do everything in his power to prevent that outcome, to which Pt said, \"sure as hell feels like I'm going to anyway!\"

## 2024-11-01 NOTE — ED NOTES
RN alerted that Pt had another gush of blood from his R nostril.  RN informed MD Martin who stated that no need for an H and H, unless MD Evans would like to recheck him.

## 2024-11-01 NOTE — ED NOTES
Pt up to the bathroom, verbalizing that he has found his care unsatisfactory due to the fact that no issue has been resolved.  RN emphasized with Pt's verbal frustration.  RN reached out to Dr. Green's office to inquire about ETA to the emergency room for treatment for the Pt.

## 2024-11-01 NOTE — ED NOTES
RN inquired if MD would like to recheck H and H and per MD hold off for now due to Pt being hemodynamically stable.

## 2024-11-01 NOTE — ED NOTES
Pt called out stating that bleeding was occurring around the R side of his nose with the left side being packed by the MD with txa applied.  Pt yelled, \"Get the specialist in here, because I'm not going to be experimented on, I know my rights!\"  RN attempted to explain to Pt that the process and plan of care that he is undergoing is what we commonly do according to best practice.  Pt sharply responded with, \"whatever!\"  MD made aware of Pt statements and will re-evaluate Pt.

## 2024-11-01 NOTE — ED NOTES
MD informed that Pt having blood coming out of R nostril at this time.  MD verbalized that he would inform ENT.

## 2024-11-01 NOTE — ED PROVIDER NOTES
EMERGENCY DEPARTMENT PROVIDER NOTE    Patient Identification  Pt Name: Ezra Gonzalez  MRN: [id number]  Birthdate 1950  Date of evaluation: 11/1/2024  Provider: Enzo Martin DO  PCP: Jazmin Noyola (Inactive)    Chief Complaint  Epistaxis (Pt brought to the hospital by [facility] from [facility] in [place] due to having a nosebleed that started 2 hours ago.  Pt states that he feels like he was swallowing blood and has continued to cough up clots.  )      HPI  (History provided by patient)  This is a 74 y.o. male with pertinent past medical history of CAD, hypertension who was brought in by self for nosebleed which began about 2 hours prior to arrival.  Denies any history of nosebleeds in the past, states he felt his nose running today and wiped it with a tissue to see blood.  Has felt blood running down the back of his throat as well.  Drove to his cardiology clinic where he was evaluated and instructed to come to the ED.  He denies any fevers, chills, nausea, lightheadedness or dizziness.  Takes aspirin and Plavix.       I have reviewed the following nursing documentation:  Allergies: Patient has no known allergies.    Past medical history:   Past Medical History:   Diagnosis Date    CAD (coronary artery disease)     Glaucoma     Hypertension      Past surgical history:   Past Surgical History:   Procedure Laterality Date    ARM SURGERY      CARDIAC PROCEDURE Left 5/30/2024    Peripheral angiography performed by Minesh Morton MD at [facility] CARDIAC CATH LAB    CARDIAC PROCEDURE Right 10/3/2024    Peripheral angiography performed by Minesh Morton MD at [facility] CARDIAC CATH LAB    CATARACT EXTRACTION      DIAGNOSTIC CARDIAC CATH LAB PROCEDURE      INVASIVE VASCULAR N/A 10/3/2024    Angioplasty peripheral artery performed by Minesh Morton MD at [facility] CARDIAC CATH LAB       Home medications:   Previous Medications    ALBUTEROL SULFATE HFA (PROVENTIL;VENTOLIN;PROAIR) 108 (90 BASE) MCG/ACT INHALER    Inhale

## 2024-11-01 NOTE — ED PROVIDER NOTES
I received this patient in signout from Dr. Martin.  We discussed the patient's initial history, physical, workup thus far, and medical decision making to this point.    Briefly, Ezra Gonzalez is a 74 y.o. male  who presents to the ED complaining of L sided epistaxis.  Initial history/exam also pertinent for being packed in the left nostril prior to my care for the patient, ENT coming to see the patient for additional evaluations as he is still a little bit oozy.  Refusing further ED attempts at management without specialist consultation so ENT has been consulted and will see the patient soon.    Pending studies at time of signout include: ENT recommendations    The patient will be reassessed after workup above is completed.  Anticipated disposition at time of signout is undetermined.      EKG performed in ED:  None      RADIOLOGY  Any applicable radiology studies including x-ray, CT, MRI, and/or ultrasound, were reviewed independently by me in addition to the radiologist.  I reviewed all radiology images and reports as well from this evaluation.    No imaging performed      ED COURSE/MDM  I introduced myself to the patient and performed my initial assessment on Ezra Gonzalez.  There is no significant change in the patient's history from what is documented initially by Dr. Martin  after my evaluation.    Old records reviewed. Labs and imaging reviewed.  On my physical examination of the patient, bleeding has essentially slowed to a stop at this point with packing in place.  Since time of sign out, the patient's ED workup was notable for review of labs including stable hemoglobin, he is on antiplatelet but not otherwise systemic anticoagulant agents.  Coagulopathy studies are normal today.  ENT came and saw the patient however apparently the patient refused any further intervention except did allow adjustment of the amount of air in the packing.  The patient was told to follow-up with him in the office on Monday and  hemostasis has been obtained.  That being said the patient has blatantly stated that he intends to come back if he rebleeds, however also intends to cut to the tubing on the packing and remove it himself and has no intention of following up on Monday as we recommend and as ENT has also recommended.  Nonetheless he will be provided information to follow-up on Monday in the hopes that he changes his mind and follow his recommendations that his both we and the specialist have given him.  He will be prescribed prophylactic Keflex as well    Is this patient to be included in the SEP-1 Core Measure?  No     Exclusion criteria - the patient is NOT to be included for SEP-1 Core Measure due to:  Infection is not suspected      Consults:  Dr. Green from ENT was personally consulted about the patient's ED history, physical, workup, and course so far.  Recommendations from this consultant included outpt f/u on Monday, patient refused any additional interventions except for slight increase in amount of air in packing.  Hemostasis was obtained    History obtained from: Patient    Pertinent social determinants of health: None applicable    Chronic conditions potentially affecting care: None applicable    Review of other records:  None    Reassessment:  See MDM for details of medications given and reassessment    During the patient's ED course, the patient was given:  Medications   oxymetazoline (AFRIN) 0.05 % nasal spray (  Not Given 11/1/24 1125)   oxymetazoline (AFRIN) 0.05 % nasal spray 2 spray (2 sprays Each Nostril Given 11/1/24 1125)   tranexamic acid (CYKLOKAPRON) 1000 MG/10ML injection (  Given 11/1/24 1235)   oxyCODONE-acetaminophen (PERCOCET) 5-325 MG per tablet 1 tablet (1 tablet Oral Given 11/1/24 1401)   acetaminophen (TYLENOL) tablet 325 mg (325 mg Oral Given 11/1/24 1401)   ondansetron (ZOFRAN-ODT) disintegrating tablet 4 mg (4 mg Oral Given 11/1/24 1401)        CLINICAL IMPRESSION  1. Acute posterior epistaxis

## 2024-11-01 NOTE — CONSULTS
[facility]       Otolaryngology Consultation      Requesting Physician / Provider:  ***       Date of Consultation:  ***/***/2024        Date of Admission:   ***/***/2024      Admission diagnosis:  No admission diagnoses are documented for this encounter.      History Obtained From:  {HISTORY SOURCE:[id number]::\"patient\"}        IMPRESSION:         RECOMMENDATIONS:             CHIEF COMPLAINT / Reason for Consultation:  ***      HISTORY OF PRESENT ILLNESS:    Ezra Gonzalez is a 74 y.o. male is a new patient to me.  An otolaryngologic consultation was requested for evaluation of the above reason for consultation.  ***      REVIEW OF SYSTEMS:    I reviewed the ROS in the admission history and physical.      Past Medical History:       Diagnosis Date    CAD (coronary artery disease)     Glaucoma     Hypertension        Past Surgical History:        Procedure Laterality Date    ARM SURGERY      CARDIAC PROCEDURE Left 5/30/2024    Peripheral angiography performed by Minesh Morton MD at [facility] CARDIAC CATH LAB    CARDIAC PROCEDURE Right 10/3/2024    Peripheral angiography performed by Minesh Morton MD at [facility] CARDIAC CATH LAB    CATARACT EXTRACTION      DIAGNOSTIC CARDIAC CATH LAB PROCEDURE      INVASIVE VASCULAR N/A 10/3/2024    Angioplasty peripheral artery performed by Minesh Morton MD at [facility] CARDIAC CATH LAB       Current Medications:   Current Facility-Administered Medications: oxymetazoline (AFRIN) 0.05 % nasal spray, , ,     Allergies:  No Known Allergies     Social History:    TOBACCO:   reports that he has been smoking cigarettes. He started smoking about 11 months ago. He has a 55.2 pack-year smoking history. He has never used smokeless tobacco.  ETOH:   reports current alcohol use.    Family History:       Problem Relation Age of Onset    Cancer Father          PHYSICAL EXAM:      Constitutional:    Vitals:    Vitals:    11/01/24 1530   BP: 138/60   Pulse: 65   Resp: 17  identified patient or procedure risk factors.    Decision regarding emergency major surgery  Decision regarding hospitalization            TIME:  Total unit time = *** minutes.

## 2024-11-02 ENCOUNTER — HOSPITAL ENCOUNTER (EMERGENCY)
Age: 74
Discharge: HOME | End: 2024-11-02
Attending: EMERGENCY MEDICINE
Payer: MEDICARE

## 2024-11-02 VITALS
HEIGHT: 69 IN | HEART RATE: 80 BPM | WEIGHT: 135 LBS | DIASTOLIC BLOOD PRESSURE: 62 MMHG | BODY MASS INDEX: 19.99 KG/M2 | SYSTOLIC BLOOD PRESSURE: 154 MMHG | OXYGEN SATURATION: 100 % | RESPIRATION RATE: 18 BRPM | TEMPERATURE: 98.1 F

## 2024-11-02 DIAGNOSIS — R04.0 EPISTAXIS: Primary | ICD-10-CM

## 2024-11-02 DIAGNOSIS — J34.89 NASAL PAIN: ICD-10-CM

## 2024-11-02 LAB
ALBUMIN SERPL-MCNC: 4.6 G/DL (ref 3.4–5)
ALBUMIN/GLOB SERPL: 1.8 {RATIO} (ref 1.1–2.2)
ALP SERPL-CCNC: 93 U/L (ref 40–129)
ALT SERPL-CCNC: 8 U/L (ref 10–40)
ANION GAP SERPL CALCULATED.3IONS-SCNC: 11 MMOL/L (ref 3–16)
APTT BLD: 27.4 SEC (ref 22.1–36.4)
AST SERPL-CCNC: 13 U/L (ref 15–37)
BASOPHILS # BLD: 0.1 K/UL (ref 0–0.2)
BASOPHILS NFR BLD: 0.9 %
BILIRUB SERPL-MCNC: 0.5 MG/DL (ref 0–1)
BUN SERPL-MCNC: 17 MG/DL (ref 7–20)
CALCIUM SERPL-MCNC: 9.8 MG/DL (ref 8.3–10.6)
CHLORIDE SERPL-SCNC: 104 MMOL/L (ref 99–110)
CO2 SERPL-SCNC: 28 MMOL/L (ref 21–32)
CREAT SERPL-MCNC: 0.7 MG/DL (ref 0.8–1.3)
DEPRECATED RDW RBC AUTO: 18.5 % (ref 12.4–15.4)
EOSINOPHIL # BLD: 0 K/UL (ref 0–0.6)
EOSINOPHIL NFR BLD: 0.9 %
GFR SERPLBLD CREATININE-BSD FMLA CKD-EPI: >90 ML/MIN/{1.73_M2}
GLUCOSE SERPL-MCNC: 98 MG/DL (ref 70–99)
HCT VFR BLD AUTO: 29.1 % (ref 40.5–52.5)
HGB BLD-MCNC: 10.2 G/DL (ref 13.5–17.5)
INR PPP: 1.11 (ref 0.85–1.15)
LYMPHOCYTES # BLD: 0.9 K/UL (ref 1–5.1)
LYMPHOCYTES NFR BLD: 16.5 %
MCH RBC QN AUTO: 28.4 PG (ref 26–34)
MCHC RBC AUTO-ENTMCNC: 34.9 G/DL (ref 31–36)
MCV RBC AUTO: 81.2 FL (ref 80–100)
MONOCYTES # BLD: 0.4 K/UL (ref 0–1.3)
MONOCYTES NFR BLD: 7.2 %
NEUTROPHILS # BLD: 4.2 K/UL (ref 1.7–7.7)
NEUTROPHILS NFR BLD: 74.5 %
PLATELET # BLD AUTO: 575 K/UL (ref 135–450)
PMV BLD AUTO: 7.6 FL (ref 5–10.5)
POTASSIUM SERPL-SCNC: 3.6 MMOL/L (ref 3.5–5.1)
PROT SERPL-MCNC: 7.1 G/DL (ref 6.4–8.2)
PROTHROMBIN TIME: 14.5 SEC (ref 11.9–14.9)
RBC # BLD AUTO: 3.58 M/UL (ref 4.2–5.9)
SODIUM SERPL-SCNC: 143 MMOL/L (ref 136–145)
WBC # BLD AUTO: 5.7 K/UL (ref 4–11)

## 2024-11-02 PROCEDURE — 99283 EMERGENCY DEPT VISIT LOW MDM: CPT

## 2024-11-02 PROCEDURE — 85610 PROTHROMBIN TIME: CPT

## 2024-11-02 PROCEDURE — 6370000000 HC RX 637 (ALT 250 FOR IP): Performed by: PHYSICIAN ASSISTANT

## 2024-11-02 PROCEDURE — 85730 THROMBOPLASTIN TIME PARTIAL: CPT

## 2024-11-02 PROCEDURE — 85025 COMPLETE CBC W/AUTO DIFF WBC: CPT

## 2024-11-02 PROCEDURE — 80053 COMPREHEN METABOLIC PANEL: CPT

## 2024-11-02 RX ORDER — HYDROCODONE BITARTRATE AND ACETAMINOPHEN 5; 325 MG/1; MG/1
1 TABLET ORAL ONCE
Status: COMPLETED | OUTPATIENT
Start: 2024-11-02 | End: 2024-11-02

## 2024-11-02 RX ADMIN — HYDROCODONE BITARTRATE AND ACETAMINOPHEN 1 TABLET: 5; 325 TABLET ORAL at 19:50

## 2024-11-02 ASSESSMENT — PAIN DESCRIPTION - LOCATION: LOCATION: NOSE

## 2024-11-02 ASSESSMENT — ENCOUNTER SYMPTOMS
SHORTNESS OF BREATH: 0
ABDOMINAL PAIN: 0
DIARRHEA: 0
VOMITING: 0
NAUSEA: 0
CHEST TIGHTNESS: 0

## 2024-11-02 ASSESSMENT — LIFESTYLE VARIABLES
HOW MANY STANDARD DRINKS CONTAINING ALCOHOL DO YOU HAVE ON A TYPICAL DAY: 3 OR 4
HOW OFTEN DO YOU HAVE A DRINK CONTAINING ALCOHOL: 2-4 TIMES A MONTH

## 2024-11-02 ASSESSMENT — PAIN SCALES - GENERAL
PAINLEVEL_OUTOF10: 7
PAINLEVEL_OUTOF10: 8

## 2024-11-02 ASSESSMENT — PAIN - FUNCTIONAL ASSESSMENT: PAIN_FUNCTIONAL_ASSESSMENT: 0-10

## 2024-11-02 ASSESSMENT — PAIN DESCRIPTION - DESCRIPTORS: DESCRIPTORS: ACHING

## 2024-11-02 NOTE — ED PROVIDER NOTES
[facility] EMERGENCY DEPARTMENT  EMERGENCY DEPARTMENT ENCOUNTER        Pt Name: Ezra Gonzalez  MRN: [id number]  Birthdate 1950  Date of evaluation: 11/2/2024  Provider: Suman Nation PA-C  PCP: Jazmin Noyola (Inactive)  Note Started: 7:50 PM EDT 11/2/24       I have seen and evaluated this patient with my supervising physician Bert Evans MD.      CHIEF COMPLAINT       Chief Complaint   Patient presents with    Epistaxis     Pt to ED with c/o nose bleed that he has been seen multiple times for over the past day. Pt has a nasal rocket in. Pt is on blood thinners.        HISTORY OF PRESENT ILLNESS: 1 or more Elements     History from : Patient    Limitations to history : None    Ezra Gonzalez is a 74 y.o. male with a history of hypertension, hyperlipidemia, CAD, anticoagulation with aspirin and Plavix and current tobacco abuse who presents to the emergency department today complaining of recurrent left-sided epistaxis.  Patient was seen at this facility stating he began having left-sided epistaxis around 8 AM.  Patient had a nasal tampon placed.  He did not really tolerate air in the tampon.  ENT was called to the emergency department.  Patient states he could not tolerate the pressure and actually took scissors and cut the tubing to the nasal tampon.  This prompted his visit to [facility] this morning secondary to recurrent bleeding.  He had another 7.5 nasal tampon placed.  Patient presents today complaining of discomfort from the tampon and also continued oozing from the left nares.  Patient denies fever or chills.  He denies headache, lightheadedness or dizziness.        Nursing Notes were all reviewed and agreed with or any disagreements were addressed in the HPI.    REVIEW OF SYSTEMS :      Review of Systems   Constitutional:  Negative for chills and fever.   HENT:  Positive for nosebleeds.    Respiratory:  Negative for chest tightness and shortness of breath.   visit to [facility] this morning secondary to recurrent bleeding.  He had another 7.5 nasal tampon placed.  Patient presents today complaining of discomfort from the tampon and also continued oozing from the left nares.  Patient denies fever or chills.  He denies headache, lightheadedness or dizziness.    Patient has a nasal tampon in the left nares.  There is a scant amount of blood within the posterior oropharynx.  There is no blood coming from the left or right nares anteriorly.  Remainder of the HEENT exam is unremarkable    CBC is without leukocytosis.  Patient has had a slight drop in his hemoglobin from 11.3-10.2.  BMP, LFTs and coags are unremarkable.    The attending physician did have a lengthy discussion with the patient regarding his epistaxis.  Patient declines any additional intervention.  He will not allow us to inflate his nasal tampon.  There does not appear to be any active bleeding at this time    Disposition Considerations (include 1 Tests not done, Shared Decision Making, Pt Expectation of Test or Tx.): Patient does have a follow-up appointment with the ENT.  He refuses any intervention here.  There does not appear to be any active bleeding at this time.  He is hemodynamically stable.  He is given strict return precautions.      Appropriate for outpatient management        I am the Primary Clinician of Record.    FINAL IMPRESSION      1. Epistaxis    2. Nasal pain          DISPOSITION/PLAN     DISPOSITION Decision To Discharge 11/02/2024 08:37:27 PM           PATIENT REFERRED TO:  Rogelio Green MD  [address]  [address]  [place] [address]  [phone]    Go on 11/4/2024  For symptom re-evaluation    [facility] Emergency Department  [address] [place] [address]  [phone]  Go to   For symptom re-evaluation, If symptoms worsen      DISCHARGE MEDICATIONS:  Discharge Medication List as of 11/2/2024  8:44 PM          DISCONTINUED MEDICATIONS:  Discharge

## 2024-11-03 NOTE — ED PROVIDER NOTES
I personally saw the patient and made/approved the management plan and take responsibility for the patient management.    For further details of Ezra Gonzalez's emergency department encounter, please see the advanced practice provider's documentation.    I, Bert Evans MD, am the primary physician provider of record.    CHIEF COMPLAINT  Chief Complaint   Patient presents with    Epistaxis     Pt to ED with c/o nose bleed that he has been seen multiple times for over the past day. Pt has a nasal rocket in. Pt is on blood thinners.      Briefly, Ezra Gonzalez is a 74 y.o. male  who presents to the ED complaining of ongoing oozing from his left nostril sometimes coming out from the right nostril.  Of note I saw him yesterday for the same complaint and he was packed.  When he left, he apparently removed the packing on his own.  Then he continued to have bleeding so he went to a different hospital where it was replaced and hemostasis was obtained.  He had not filled his Keflex at that time but has filled it at this point as an antibiotic prophylaxis.  He is mainly here for left nostril discomfort.  However he did not take anything for his discomfort.  He was prescribed Tylenol from the other hospital but is yet to get it filled and does not have it over-the-counter.    FOCUSED PHYSICAL EXAMINATION  BP (!) 154/62   Pulse 80   Temp 98.1 °F (36.7 °C) (Oral)   Resp 18   Ht 1.753 m (5' 9\")   Wt 61.2 kg (135 lb)   SpO2 100%   BMI 19.94 kg/m²    Focused physical examination notable for no acute distress, well-appearing, well-nourished, normal speech and mentation without obvious facial droop, no obvious rash.  No obvious cranial nerve deficits on my initial exam.  Left nostril rapid Rhino in place.  On my assessment there is no active bleeding noted.  Oropharynx is clear.  The right nostril is clear.      EKG performed in ED:  None      RADIOLOGY  Any applicable radiology studies including x-ray, CT, MRI,

## 2024-11-04 ENCOUNTER — TELEPHONE (OUTPATIENT)
Dept: CARDIOLOGY CLINIC | Age: 74
End: 2024-11-04

## 2024-11-04 NOTE — TELEPHONE ENCOUNTER
Spoke to pt to discuss Plavix. He spoke to ENT about blood thinner, told to speak to WAK. He has not taken plavix and asa since Thursday.        BP: 157/125 115/165- Writer suggested patient to change out his batteries in his machine and call back to discuss. Please transfer call to ROMAN Wall.

## 2024-11-04 NOTE — TELEPHONE ENCOUNTER
Ezra changed the batteries and now his B/P is 130/58 & 125/58 HR 70's.  He c/o headache.  I told him he can take Tylenol.    He is still asking about taking the Plavix & asa. His nose was packed 3 days ago due to bleeding. He pulled out some of the packing himself & cut the tube; nares started to bleed again so he went to the ER, and they repacked it. He is due to see Dr. Stephen tomorrow. I told him not to take out anymore packing.     He sees Dr. Morton 11/11/24, s/p Rt mid SFA stenting on 10/3/24. I told him to call Dr. Morton tomorrow to discuss the blood thinner issue.      He has a stress test and OV with Dr. Au 11/19/24.

## 2024-11-05 ENCOUNTER — TELEPHONE (OUTPATIENT)
Dept: CARDIOLOGY CLINIC | Age: 74
End: 2024-11-05

## 2024-11-05 ENCOUNTER — OFFICE VISIT (OUTPATIENT)
Dept: ENT CLINIC | Age: 74
End: 2024-11-05
Payer: MEDICARE

## 2024-11-05 VITALS
WEIGHT: 136 LBS | OXYGEN SATURATION: 96 % | BODY MASS INDEX: 20.14 KG/M2 | DIASTOLIC BLOOD PRESSURE: 64 MMHG | SYSTOLIC BLOOD PRESSURE: 129 MMHG | TEMPERATURE: 97.7 F | HEIGHT: 69 IN | HEART RATE: 72 BPM

## 2024-11-05 DIAGNOSIS — J34.89 NASAL MUCOSA DRY: ICD-10-CM

## 2024-11-05 DIAGNOSIS — R04.0 EPISTAXIS: Primary | ICD-10-CM

## 2024-11-05 DIAGNOSIS — I70.213 ATHEROSCLEROSIS OF NATIVE ARTERY OF BOTH LOWER EXTREMITIES WITH INTERMITTENT CLAUDICATION (HCC): ICD-10-CM

## 2024-11-05 PROCEDURE — 1159F MED LIST DOCD IN RCRD: CPT | Performed by: STUDENT IN AN ORGANIZED HEALTH CARE EDUCATION/TRAINING PROGRAM

## 2024-11-05 PROCEDURE — G8427 DOCREV CUR MEDS BY ELIG CLIN: HCPCS | Performed by: STUDENT IN AN ORGANIZED HEALTH CARE EDUCATION/TRAINING PROGRAM

## 2024-11-05 PROCEDURE — 3017F COLORECTAL CA SCREEN DOC REV: CPT | Performed by: STUDENT IN AN ORGANIZED HEALTH CARE EDUCATION/TRAINING PROGRAM

## 2024-11-05 PROCEDURE — G8420 CALC BMI NORM PARAMETERS: HCPCS | Performed by: STUDENT IN AN ORGANIZED HEALTH CARE EDUCATION/TRAINING PROGRAM

## 2024-11-05 PROCEDURE — 1126F AMNT PAIN NOTED NONE PRSNT: CPT | Performed by: STUDENT IN AN ORGANIZED HEALTH CARE EDUCATION/TRAINING PROGRAM

## 2024-11-05 PROCEDURE — 1123F ACP DISCUSS/DSCN MKR DOCD: CPT | Performed by: STUDENT IN AN ORGANIZED HEALTH CARE EDUCATION/TRAINING PROGRAM

## 2024-11-05 PROCEDURE — G8484 FLU IMMUNIZE NO ADMIN: HCPCS | Performed by: STUDENT IN AN ORGANIZED HEALTH CARE EDUCATION/TRAINING PROGRAM

## 2024-11-05 PROCEDURE — 99203 OFFICE O/P NEW LOW 30 MIN: CPT | Performed by: STUDENT IN AN ORGANIZED HEALTH CARE EDUCATION/TRAINING PROGRAM

## 2024-11-05 PROCEDURE — 4004F PT TOBACCO SCREEN RCVD TLK: CPT | Performed by: STUDENT IN AN ORGANIZED HEALTH CARE EDUCATION/TRAINING PROGRAM

## 2024-11-05 RX ORDER — ECHINACEA PURPUREA EXTRACT 125 MG
2 TABLET ORAL 4 TIMES DAILY
Qty: 1 EACH | Refills: 3 | Status: SHIPPED | OUTPATIENT
Start: 2024-11-05

## 2024-11-05 RX ORDER — CILOSTAZOL 100 MG/1
100 TABLET ORAL 2 TIMES DAILY
Qty: 60 TABLET | Refills: 3 | Status: SHIPPED | OUTPATIENT
Start: 2024-11-05

## 2024-11-05 RX ORDER — OXYMETAZOLINE HYDROCHLORIDE 0.05 G/100ML
2 SPRAY NASAL PRN
Qty: 1 EACH | Refills: 1 | Status: SHIPPED | OUTPATIENT
Start: 2024-11-05 | End: 2025-11-05

## 2024-11-05 RX ORDER — MUPIROCIN 20 MG/G
OINTMENT TOPICAL
Qty: 1 EACH | Refills: 1 | Status: SHIPPED | OUTPATIENT
Start: 2024-11-05

## 2024-11-05 NOTE — TELEPHONE ENCOUNTER
Ezra called the office wanting to be advised if he can/should start back on his OAC?     Previous msg with Dr. Au office:   \"He is still asking about taking the Plavix & asa. His nose was packed 3 days ago due to bleeding. He pulled out some of the packing himself & cut the tube; nares started to bleed again so he went to the ER, and they repacked it. He is due to see Dr. Stephen tomorrow. I told him not to take out anymore packing.      He sees Dr. Morton 11/11/24, s/p Rt mid SFA stenting on 10/3/24. I told him to call Dr. Morton tomorrow to discuss the blood thinner issue.\"     Please advise.    Ezra's callback: [phone]

## 2024-11-05 NOTE — TELEPHONE ENCOUNTER
Attempted to call patient back.  Per Dr. Morton, patient to resume ASA 81 mg only.  Can continue to hold plavix.   Lvm for patient to return call.

## 2024-11-05 NOTE — PATIENT INSTRUCTIONS
DR. OKEEFE'S NOSEBLEED INSTRUCTIONS:    - STOP Flonase use  - Obtain nasal saline spray over the counter. Spray nasal saline spray or gel multiple times a day (up to 5-6 times throughout the day) in each nostril to help with nasal mucosal moisturization for the next 3 weeks. You can get this over the counter. You cannot over use this!  - Place antibiotic ointment (I.e. polysporin, triple antiobiotic ointment, bactroban) to inside of both nostrils and along the nasal septum twice daily for next 3 weeks then as needed for moisturization.  - Consider humidification machine in the bedroom to help with nasal moisturization  - Avoid nasal trauma nose picking, harsh nasal blowing, rubbing nose after blowing! If you need to blow your nose blow very gently and do not harshly wipe nose afterwards.  - In case of another nosebleed: First spray AFRIN (can obtain over the counter) in affected nostril, then saturate a cottonball with Afrin and place it on the affected side and then placing unrelenting digital pressure for at least 15 minutes.  After this take the cottonball allowed and reinspected.  If still bleeding please repeat.  If Bleeding does not stop after 1 hour or you lose a large amount of blood go to emergency department.

## 2024-11-05 NOTE — PROGRESS NOTES
Interventional Cardiology Consultation     Ezra Gonzalez  1950    PCP: Jazmin Noyola (Inactive)  Referring Physician: Dr. Au  Reason for Referral: PAD  Chief Complaint: \"I FEEL GREAT\"  Chief Complaint   Patient presents with    Follow-up    Fatigue    Other     Pain in upper abdomen       Subjective:   History of Present Illness: The patient is 74 y.o. male with a past medical history significant for CAD and HTN. Patient follows with Dr. Au for primary cardiology. History of peripheral artery disease with lifestyle limiting claudication. Previously followed with Dr. Mccann. Patient s/p successful balloon angioplasty, throbmectomy and stenting of the left SFA ( X 3) 5/30/24. S/p successful shockwave lithotripsy, balloon angioplasty and stenting  ( 7X 150 mm mid SFA) 10/3/24.       Past Medical History:   Diagnosis Date    CAD (coronary artery disease)     Glaucoma     Hypertension      Past Surgical History:   Procedure Laterality Date    ARM SURGERY      CARDIAC PROCEDURE Left 5/30/2024    Peripheral angiography performed by Minesh Morton MD at [facility] CARDIAC CATH LAB    CARDIAC PROCEDURE Right 10/3/2024    Peripheral angiography performed by Minesh Morton MD at [facility] CARDIAC CATH LAB    CATARACT EXTRACTION      DIAGNOSTIC CARDIAC CATH LAB PROCEDURE      INVASIVE VASCULAR N/A 10/3/2024    Angioplasty peripheral artery performed by Minesh Morton MD at [facility] CARDIAC CATH LAB     Family History   Problem Relation Age of Onset    Cancer Father      Social History     Tobacco Use    Smoking status: Some Days     Current packs/day: 0.25     Average packs/day: 1 pack/day for 55.9 years (55.2 ttl pk-yrs)     Types: Cigarettes     Start date: 12/1/2023    Smokeless tobacco: Never    Tobacco comments:     Trying to quit. Smokes 2-3 cigarettes daily. 2 packs a week   Vaping Use    Vaping status: Never Used   Substance Use Topics    Alcohol use: Yes     Comment: nightly    Drug use: Not Currently

## 2024-11-05 NOTE — TELEPHONE ENCOUNTER
Spoke with patient.   States he saw ENT today, took out nose packing and gave \"medicine\"  Denies any nose bleeds today .  Advised he restart ASA and continue to hold Plavix per Dr. Morton.   Monitor for any reoccurring epistaxis  Will further discuss plan of care at f/u on 11/11  Patient v/u to all of the above

## 2024-11-05 NOTE — PROGRESS NOTES
critical portions of the service.    Assessment and Plan     1. Epistaxis  2. Nasal mucosa dry  -Left-sided Rhino Rocket removed today.  No active bleeding after removal  - Likely precipitated by dry nasal mucosa and being on aspirin and Plavix.  He has plans to call his cardiologist about his aspirin and Plavix use and to see if he can discontinue these but if he needs to go back on them we will just need to continue moisturizing his nose as to help prevent nosebleeds going forward  - Mupirocin ointment inside of both nostrils along the nasal septum twice daily for the next 3 weeks then as needed  -Frequent use of nasal saline sprays or gel multiple times a day  -Consider humidification in the bedroom to help with moisturization  -Avoid nasal trauma including nose picking, harsh nasal blowing, rubbing nose after blowing.  -Epistaxis instructions given in case of future bleeding.  -If continues to bleed despite conservative measures patient instructed to call the office and schedule a follow-up appointment.        Follow Up     Return if symptoms worsen or fail to improve.        Dr. Brennon Stephen,   [facility]  Department of Otolaryngology/Head & Neck Surgery  11/5/24    Medical Decision Making:  The following items were considered in medical decision making:  Independent review of images  Review / order clinical lab tests  Review / order radiology tests  Decision to obtain old records    This note was generated completely or in part utilizing Dragon dictation speech recognition software.  Occasionally, words are mistranscribed and despite editing, the text may contain inaccuracies due to incorrect word recognition.  If further clarification is needed please contact the office at [phone].

## 2024-11-06 NOTE — PROGRESS NOTES
marivel Au MD  [facility]  Noninvasive and Adult Congenital Cardiologist  [phone]  [email or web address]      Scribe Attestation: This note was scribed in the presence of Dr. Angely MD by Joanie Weaver RN.    Physician Attestation  The scribe wrote this note in the presence of me (New Au MD).  The scribe may have prepopulated components of this note with my historical  intellectual property under my direct supervision.  Any additions to this intellectual property were performed in my presence and at my direction.  Furthermore, the content and accuracy of this note have been reviewed by me with edits by me as needed.  New Au MD 11/19/2024 11:39 AM    Billing attestation for O/O E/M visit: I intend to provide longitudinal care of this patient's significant CAD and PAD.

## 2024-11-08 ENCOUNTER — HOSPITAL ENCOUNTER (OUTPATIENT)
Dept: PHYSICAL THERAPY | Age: 74
Setting detail: THERAPIES SERIES
Discharge: HOME | End: 2024-11-08
Payer: MEDICARE

## 2024-11-08 PROCEDURE — 97110 THERAPEUTIC EXERCISES: CPT

## 2024-11-08 NOTE — FLOWSHEET NOTE
10 reps  - Side Stepping with Resistance at Ankles  - 1 x daily - 7 x weekly - 3 sets - 10 reps  - Supine Active Straight Leg Raise  - 1 x daily - 7 x weekly - 3 sets - 10 reps  - Supine Bridge with Resistance Band  - 1 x daily - 7 x weekly - 3 sets - 10 reps      ASSESSMENT     Today's Assessment:  pt performed exercises in bold on flow sheet this date with cueing for improved technique and control to limit trunk compensations.  Pt continues to demonstrate hip weakness especially on L hip with exercises.  Pt challenged with weighted 4 way walk as well as resisted hip extension/abduction exercises with decreased hip strength noted. Pt also limited this date overall by decreased endurance and general fatigue. Continue with progressions and exercises for strengthening as well as improved endurance as tolerated for increased strength and functional mobility.    Medical Necessity Documentation:  I certify that this patient meets the below criteria necessary for medical necessity for care and/or justification of therapy services:  The patient has functional impairments and/or activity limitations and would benefit from continued outpatient therapy services to address the deficits outlined in the patients goals    Return to Play: NA    Prognosis for POC: [x] Good [] Fair  [] Poor    Patient requires continued skilled intervention: [x] Yes  [] No      CHARGE CAPTURE     PT CHARGE GRID   CPT Code (TIMED) minutes # CPT Code (UNTIMED) #     Therex (59593)  40 3  EVAL:LOW (69790 - Typically 20 minutes face-to-face)     Neuromusc. Re-ed (05482)    Re-Eval (67470)     Manual (70578)    Estim Unattended (75608)     Ther. Act (80339)    [facility] Traction (80663)     Gait (63543)    Dry Needle 1-2 muscle (20560)     Aquatic Therex (04626)    Dry Needle 3+ muscle (20561)     Iontophoresis (32885)    VASO (48266)     Ultrasound (32270)    Group Therapy (76996)     Estim Attended (23815)    Canalith Repositioning (75329)     Other:

## 2024-11-11 ENCOUNTER — HOSPITAL ENCOUNTER (OUTPATIENT)
Dept: PHYSICAL THERAPY | Age: 74
Setting detail: THERAPIES SERIES
End: 2024-11-11
Payer: MEDICARE

## 2024-11-11 ENCOUNTER — OFFICE VISIT (OUTPATIENT)
Dept: CARDIOLOGY CLINIC | Age: 74
End: 2024-11-11

## 2024-11-11 VITALS
BODY MASS INDEX: 21.18 KG/M2 | SYSTOLIC BLOOD PRESSURE: 140 MMHG | WEIGHT: 143 LBS | DIASTOLIC BLOOD PRESSURE: 60 MMHG | HEART RATE: 85 BPM | HEIGHT: 69 IN | OXYGEN SATURATION: 100 %

## 2024-11-11 DIAGNOSIS — I73.9 PAD (PERIPHERAL ARTERY DISEASE) (HCC): Primary | ICD-10-CM

## 2024-11-11 RX ORDER — METOPROLOL SUCCINATE 25 MG/1
25 TABLET, EXTENDED RELEASE ORAL DAILY
Qty: 90 TABLET | Refills: 3
Start: 2024-11-11

## 2024-11-15 ENCOUNTER — HOSPITAL ENCOUNTER (OUTPATIENT)
Dept: VASCULAR LAB | Age: 74
Discharge: HOME | End: 2024-11-17
Attending: INTERNAL MEDICINE
Payer: MEDICARE

## 2024-11-15 ENCOUNTER — TELEPHONE (OUTPATIENT)
Dept: CARDIOLOGY CLINIC | Age: 74
End: 2024-11-15

## 2024-11-15 ENCOUNTER — HOSPITAL ENCOUNTER (OUTPATIENT)
Dept: PHYSICAL THERAPY | Age: 74
Setting detail: THERAPIES SERIES
End: 2024-11-15
Payer: MEDICARE

## 2024-11-15 DIAGNOSIS — I73.9 PAD (PERIPHERAL ARTERY DISEASE) (HCC): ICD-10-CM

## 2024-11-15 LAB
VAS LEFT ABI: 1.04
VAS LEFT ARM BP: 152 MMHG
VAS LEFT ATA DIST PSV: 63.4 CM/S
VAS LEFT ATA MID PSV: 24.6 CM/S
VAS LEFT ATA PROX PSV: 56.2 CM/S
VAS LEFT CFA DIST PSV: 171 CM/S
VAS LEFT CFA PROX PSV: 199 CM/S
VAS LEFT DORSALIS PEDIS BP: 158 MMHG
VAS LEFT PERONEAL DIST PSV: 41.3 CM/S
VAS LEFT PERONEAL MID PSV: 95.1 CM/S
VAS LEFT PERONEAL PROX PSV: 44.7 CM/S
VAS LEFT PFA PROX PSV: 281 CM/S
VAS LEFT POP A DIST PSV: 118 CM/S
VAS LEFT POP A PROX PSV: 107 CM/S
VAS LEFT POP A PROX VEL RATIO: 1.67
VAS LEFT PTA BP: 150 MMHG
VAS LEFT PTA DIST PSV: 80.1 CM/S
VAS LEFT PTA MID PSV: 137 CM/S
VAS LEFT PTA PROX PSV: 65.1 CM/S
VAS LEFT SFA DIST PSV: 64.1 CM/S
VAS LEFT SFA DIST VEL RATIO: 1.44
VAS LEFT SFA MID PSV: 44.5 CM/S
VAS LEFT SFA MID VEL RATIO: 0.3
VAS LEFT SFA PROX PSV: 150 CM/S
VAS LEFT SFA PROX VEL RATIO: 0.75
VAS RIGHT ABI: 0.99
VAS RIGHT ARM BP: 150 MMHG
VAS RIGHT ATA DIST PSV: 108 CM/S
VAS RIGHT ATA PROX PSV: 71.5 CM/S
VAS RIGHT CFA DIST PSV: 230 CM/S
VAS RIGHT CFA PROX PSV: 171 CM/S
VAS RIGHT DORSALIS PEDIS BP: 150 MMHG
VAS RIGHT PERONEAL DIST PSV: 57.1 CM/S
VAS RIGHT PERONEAL MID PSV: 84 CM/S
VAS RIGHT PERONEAL PROX PSV: 97.4 CM/S
VAS RIGHT PFA PROX PSV: 271 CM/S
VAS RIGHT POP A DIST PSV: 88.3 CM/S
VAS RIGHT POP A PROX PSV: 89.4 CM/S
VAS RIGHT POP A PROX VEL RATIO: 0.49
VAS RIGHT PTA BP: 134 MMHG
VAS RIGHT PTA DIST PSV: 12.6 CM/S
VAS RIGHT PTA MID PSV: 16 CM/S
VAS RIGHT PTA PROX PSV: 29.5 CM/S
VAS RIGHT SFA DIST PSV: 183 CM/S
VAS RIGHT SFA DIST VEL RATIO: 3.37
VAS RIGHT SFA MID PSV: 54.3 CM/S
VAS RIGHT SFA MID VEL RATIO: 0.3
VAS RIGHT SFA PROX PSV: 157 CM/S
VAS RIGHT SFA PROX VEL RATIO: 0.7

## 2024-11-15 PROCEDURE — 93925 LOWER EXTREMITY STUDY: CPT

## 2024-11-15 PROCEDURE — 93925 LOWER EXTREMITY STUDY: CPT | Performed by: INTERNAL MEDICINE

## 2024-11-15 NOTE — TELEPHONE ENCOUNTER
Pt asking if WAK would review procedure he had on 10/3 with Dr Morton and go over with him at his 11/19/24 appt.

## 2024-11-18 ENCOUNTER — HOSPITAL ENCOUNTER (OUTPATIENT)
Dept: PHYSICAL THERAPY | Age: 74
Setting detail: THERAPIES SERIES
Discharge: HOME | End: 2024-11-18
Payer: MEDICARE

## 2024-11-18 PROCEDURE — 97110 THERAPEUTIC EXERCISES: CPT

## 2024-11-18 NOTE — FLOWSHEET NOTE
PLOF activities and allow him to initiate riding a motorcycle   [x] Progressing: [] Met: [] Not Met: [] Adjusted     Overall Progression Towards Functional goals/ Treatment Progress Update:  [] Patient is progressing as expected towards functional goals listed.    [x] Progression is slowed due to complexities/Impairments listed / lack of compliance with HEP during time off from therapy.  [] Progression has been slowed due to co-morbidities.  [] Plan just implemented, too soon (<30days) to assess goals progression   [] Goals require adjustment due to lack of progress  [] Patient is not progressing as expected and requires additional follow up with physician  [] Other:     TREATMENT PLAN     Frequency/Duration: 1-2x/week for 8-10 weeks for the following treatment interventions:    Interventions:  Therapeutic Exercise (67963) including: strength training, ROM, and functional mobility  Therapeutic Activities (05198) including: functional mobility training and education.  Neuromuscular Re-education (53276) activation and proprioception, including postural re-education.    Gait Training (13544) for normalization of ambulation patterns and AD training.   Manual Therapy (73579) as indicated to include: Passive Range of Motion  Modalities as needed that may include: Thermal Agents and Vasoneumatic Compression  Patient education on joint protection, activity modification, and progression of HEP    Plan: POC initiated as per evaluation Balance, gait , improve weight bearing tolerance. Focus on normalize gait pattern.  [employer] ok on sat but only machines he's started here with. Advised  Minimal TM use presently due to poor strength/ endurance     Electronically Signed by Sandip May, PT  PT  Date: 11/18/2024     Note: Portions of this note have been templated and/or copied from initial evaluation, reassessments and prior notes for documentation efficiency.    Note: If patient does not return for scheduled/recommended follow

## 2024-11-19 ENCOUNTER — OFFICE VISIT (OUTPATIENT)
Dept: CARDIOLOGY CLINIC | Age: 74
End: 2024-11-19

## 2024-11-19 VITALS
BODY MASS INDEX: 20.88 KG/M2 | SYSTOLIC BLOOD PRESSURE: 112 MMHG | HEART RATE: 84 BPM | OXYGEN SATURATION: 96 % | WEIGHT: 141 LBS | DIASTOLIC BLOOD PRESSURE: 50 MMHG | HEIGHT: 69 IN

## 2024-11-19 DIAGNOSIS — R53.83 FATIGUE, UNSPECIFIED TYPE: Primary | ICD-10-CM

## 2024-11-19 DIAGNOSIS — I25.10 CAD IN NATIVE ARTERY: ICD-10-CM

## 2024-11-19 DIAGNOSIS — R06.02 SOB (SHORTNESS OF BREATH): ICD-10-CM

## 2024-11-19 DIAGNOSIS — I73.9 PAD (PERIPHERAL ARTERY DISEASE) (HCC): ICD-10-CM

## 2024-11-19 DIAGNOSIS — R71.8 OTHER ABNORMALITY OF RED BLOOD CELLS: ICD-10-CM

## 2024-11-19 RX ORDER — ROSUVASTATIN CALCIUM 20 MG/1
20 TABLET, COATED ORAL DAILY
Qty: 90 TABLET | Refills: 3 | Status: SHIPPED | OUTPATIENT
Start: 2024-11-19

## 2024-11-19 RX ORDER — METOPROLOL SUCCINATE 25 MG/1
25 TABLET, EXTENDED RELEASE ORAL DAILY
Qty: 90 TABLET | Refills: 3 | Status: SHIPPED | OUTPATIENT
Start: 2024-11-19

## 2024-11-19 NOTE — PATIENT INSTRUCTIONS
No medication changes - Continue taking    Check with Dr. Morton to see if you need to continue taking Cilostazol.     Labs soon - Ferritin, TIBC and CBC - Will call you with the results    Follow up with Dr. Au in 6 months

## 2024-11-20 ENCOUNTER — HOSPITAL ENCOUNTER (OUTPATIENT)
Dept: PHYSICAL THERAPY | Age: 74
Setting detail: THERAPIES SERIES
Discharge: HOME | End: 2024-11-20
Payer: MEDICARE

## 2024-11-20 PROCEDURE — 97110 THERAPEUTIC EXERCISES: CPT

## 2024-11-20 NOTE — FLOWSHEET NOTE
prevent loss of range of motion, maintain or improve muscular strength or increase flexibility, following either an injury or surgery.     GOALS     Patient stated goal: back to normal   [x] Progressing: [] Met: [] Not Met: [] Adjusted    Therapist goals for Patient:   Short Term Goals: To be achieved in: 2 weeks  1. Independent in HEP and progression per patient tolerance, in order to prevent re-injury.   [] Progressing: [x] Met: [] Not Met: [] Adjusted  2. Patient will have a decrease in pain to <2/10 to facilitate improvement in movement, function, and ADLs as indicated by Functional Deficits.  [] Progressing: [x] Met: [] Not Met: [] Adjusted    Long Term Goals: To be achieved in: 8 weeks or discharge   1. Disability index score of 20% or less for the LEFS to assist with reaching prior level of function with activities such as normal community ambulation .  [x] Progressing: [] Met: [] Not Met: [] Adjusted  2. Patient will demonstrate increased AROM of left hip  to WNL without pain to allow for proper joint functioning to enable patient to restore normal gait mechanics and weight bearing on left leg.   [x] Progressing: [] Met: [] Not Met: [] Adjusted  3. Patient will demonstrate increased Strength of LLE to at least 4+/5 throughout without pain to allow for proper functional mobility to enable patient to return to walking without A.D.   [x] Progressing: [] Met: [] Not Met: [] Adjusted  4. Patient will return to ascending/descending steps without increased symptoms or restriction.   [x] Progressing: [] Met: [] Not Met: [] Adjusted  5. Patient will establish normal community ambulation without evidence of antalgia to restore PLOF activities and allow him to initiate riding a motorcycle   [x] Progressing: [] Met: [] Not Met: [] Adjusted     Overall Progression Towards Functional goals/ Treatment Progress Update:  [] Patient is progressing as expected towards functional goals listed.    [x] Progression is slowed due

## 2024-12-19 ENCOUNTER — HOSPITAL ENCOUNTER (OUTPATIENT)
Age: 74
Discharge: HOME | End: 2024-12-19
Payer: MEDICARE

## 2024-12-19 ENCOUNTER — HOSPITAL ENCOUNTER (OUTPATIENT)
Dept: PHYSICAL THERAPY | Age: 74
Setting detail: THERAPIES SERIES
Discharge: HOME | End: 2024-12-19
Payer: MEDICARE

## 2024-12-19 DIAGNOSIS — R53.83 FATIGUE, UNSPECIFIED TYPE: ICD-10-CM

## 2024-12-19 DIAGNOSIS — R06.02 SOB (SHORTNESS OF BREATH): ICD-10-CM

## 2024-12-19 DIAGNOSIS — R71.8 OTHER ABNORMALITY OF RED BLOOD CELLS: ICD-10-CM

## 2024-12-19 LAB
DEPRECATED RDW RBC AUTO: 20.1 % (ref 12.4–15.4)
FERRITIN SERPL IA-MCNC: 342 NG/ML (ref 30–400)
HCT VFR BLD AUTO: 33.4 % (ref 40.5–52.5)
HGB BLD-MCNC: 10.9 G/DL (ref 13.5–17.5)
IRON SATN MFR SERPL: 22 % (ref 20–50)
IRON SERPL-MCNC: 51 UG/DL (ref 59–158)
MCH RBC QN AUTO: 27.2 PG (ref 26–34)
MCHC RBC AUTO-ENTMCNC: 32.7 G/DL (ref 31–36)
MCV RBC AUTO: 83 FL (ref 80–100)
PLATELET # BLD AUTO: 476 K/UL (ref 135–450)
PMV BLD AUTO: 7.7 FL (ref 5–10.5)
RBC # BLD AUTO: 4.03 M/UL (ref 4.2–5.9)
TIBC SERPL-MCNC: 237 UG/DL (ref 260–445)
WBC # BLD AUTO: 4.5 K/UL (ref 4–11)

## 2024-12-19 PROCEDURE — 83540 ASSAY OF IRON: CPT

## 2024-12-19 PROCEDURE — 36415 COLL VENOUS BLD VENIPUNCTURE: CPT

## 2024-12-19 PROCEDURE — 97530 THERAPEUTIC ACTIVITIES: CPT

## 2024-12-19 PROCEDURE — 85027 COMPLETE CBC AUTOMATED: CPT

## 2024-12-19 PROCEDURE — 83550 IRON BINDING TEST: CPT

## 2024-12-19 PROCEDURE — 97110 THERAPEUTIC EXERCISES: CPT

## 2024-12-19 PROCEDURE — 82728 ASSAY OF FERRITIN: CPT

## 2024-12-19 NOTE — PLAN OF CARE
[facility] - Outpatient Rehabilitation and Therapy [address], [place] [address] office: [phone] fax: [phone]        Physical Therapy: TREATMENT/PROGRESS NOTE   Patient: Ezra Gonzalez (74 y.o. male)   Examination Date: 2024   :  1950 MRN: [id number]   Visit #: 14   Insurance Allowable Auth Needed   36/year []Yes    [x]No    Insurance: Payor: [facility] MEDICARE / Plan: UNITED[employer]ARE DUAL COMPLETE / Product Type: *No Product type* /   Insurance ID: [id number] - (Medicare Managed)  Secondary Insurance (if applicable): MEDICAID [place]   Treatment Diagnosis:     ICD-10-CM    1. Generalized weakness  R53.1       2. Gait abnormality  R26.9       3. At risk for falls  Z91.81       4. Decreased strength, endurance, and mobility  R53.1     Z74.09     R68.89       5. Decreased strength of lower extremity  R29.898       6. Decreased activities of daily living (ADL)  Z78.9          Medical Diagnosis:  Displaced subtrochanteric fracture of left femur, initial encounter for closed fracture [S72.22XA]   Referring Physician: April Smith APRN -*  PCP: Jazmin Noyola (Inactive)     Plan of care signed (Y/N): Y    Date of Patient follow up with Physician:      Plan of Care Report: YES, Date Range for this report:  to   POC update due: (10 visits /OR AUTH LIMITS, whichever is less)  2024                                             Medical History:  Comorbidities:  Relevant Medical History: Allergic Rhinitis, CALDERON, Dizziness, Loss of appetite, CAD, Anginal equivalent. Atherosclersosis s/p balloon angiplasty, thrombectomy, and stenting of left SFA x 3 on 24, Tobacco dependence                                         Precautions/ Contra-indications:           Latex allergy:  NO  Pacemaker:    NO  Contraindications for Manipulation: None  Date of Surgery: 24 , WBAT   Other:    Red Flags:  None    Suicide Screening:   The patient did not verbalize a primary behavioral

## 2024-12-20 ENCOUNTER — TELEPHONE (OUTPATIENT)
Dept: CARDIOLOGY CLINIC | Age: 74
End: 2024-12-20

## 2024-12-20 NOTE — TELEPHONE ENCOUNTER
----- Message from Dr. New Au MD sent at 12/20/2024  7:30 AM EST -----  Mild anemia but no significant iron deficiency  Continue current medications.

## 2024-12-27 ENCOUNTER — HOSPITAL ENCOUNTER (OUTPATIENT)
Dept: PHYSICAL THERAPY | Age: 74
Setting detail: THERAPIES SERIES
Discharge: HOME | End: 2024-12-27
Payer: MEDICARE

## 2024-12-27 PROCEDURE — 97110 THERAPEUTIC EXERCISES: CPT

## 2024-12-27 PROCEDURE — 97530 THERAPEUTIC ACTIVITIES: CPT

## 2024-12-27 NOTE — FLOWSHEET NOTE
DF 5 5      PF        Inversion        Eversion               Exercises/Interventions     Bold denotes done that day, new in bold/italics  Therapeutic Ex (40082)  resistance Sets/time Reps Notes/Cues/Progressions   Bike 3.0 5'     Quantum:   -hamstring curls  -leg extensions    35#  35#      3  2   15  10 11/8: decreased wt w/ HS curls due to muscle cramping.  Cueing to limit sliding forward in seat.    12/27 Achieved fatigue             LAQ     Bridge  Teal TB 3\"    SLR Flexion   SL ABD  SL Clams 0#    lime 2    Bridge TB 2    TR   3    Seated Hamstring curls     LP B  LP Single Leg  100#  55#  2  2 15xea  10 11/8: ^reps  12/27, increased weight    Lateral step downs 6\"  4\" 1  1    Lateral and retro slider lunges bilat    Lateral side stepping along ballet barre  5 laps     Wall sit     Cable column 4 way walk 5 plates 5 reps ea direction  11/8   Sled Push        Dead lift  2  Green KB  12                  Manual Intervention (04770)  TIME             1x200' no AD and SBA    And t/o clinic 9/10                         NMR re-education (70613) resistance Sets/time Reps CUES NEEDED   Balance tandem  CGA w/ table  HEP using a cushion to balance   Airex NBOS balance  Airex NBOS balance w/ arm swing            SLS Airex  30\" X 2                                 Therapeutic Activity (83482)  Sets/time     PN, Measurements, discussion on inconsistency with PT and how it affects outcomes and ability to continue with PT services  10 mins     Standing hip ext / abduction Lime band 1 15 8/26: resistance   11/9^ reps billed as TE this date   squat   1 20 8/26: cueing for improved form/muscle activation   Resisted forward steps ups  Lateral step ups, resisted  6\",Green KB  6\", Green KB 2  2 10 B  10    5 [facility] walking / gait training   Fingertips and no hands   Lateral band walk  Orange 20' X 6    Rockerboard f/b balance      SLS   30' X2 B                           Modalities:    No modalities applied this

## 2024-12-30 ENCOUNTER — HOSPITAL ENCOUNTER (OUTPATIENT)
Dept: PHYSICAL THERAPY | Age: 74
Setting detail: THERAPIES SERIES
Discharge: HOME | End: 2024-12-30
Payer: MEDICARE

## 2024-12-30 PROCEDURE — 97112 NEUROMUSCULAR REEDUCATION: CPT

## 2024-12-30 PROCEDURE — 97530 THERAPEUTIC ACTIVITIES: CPT

## 2024-12-30 PROCEDURE — 97110 THERAPEUTIC EXERCISES: CPT

## 2024-12-30 NOTE — FLOWSHEET NOTE
[facility] - Outpatient Rehabilitation and Therapy [address], [place] [address] office: [phone] fax: [phone]    Physical Therapy: TREATMENT/PROGRESS NOTE   Patient: Ezra Gonzalez (74 y.o. male)   Examination Date: 2024   :  1950 MRN: [id number]   Visit #: 16   Insurance Allowable Auth Needed   36/year []Yes    [x]No    Insurance: Payor: [facility] MEDICARE / Plan: UNITEDHEALTHCARE DUAL COMPLETE / Product Type: *No Product type* /   Insurance ID: [id number] - (Medicare Managed)  Secondary Insurance (if applicable): MEDICAID [place]   Treatment Diagnosis:     ICD-10-CM    1. Generalized weakness  R53.1       2. Gait abnormality  R26.9       3. At risk for falls  Z91.81       4. Decreased strength, endurance, and mobility  R53.1     Z74.09     R68.89       5. Decreased strength of lower extremity  R29.898       6. Decreased activities of daily living (ADL)  Z78.9          Medical Diagnosis:  Displaced subtrochanteric fracture of left femur, initial encounter for closed fracture [S72.22XA]   Referring Physician: April Smith APRN -*  PCP: Jazmin Noyola (Inactive)     Plan of care signed (Y/N): Y    Date of Patient follow up with Physician:      Plan of Care Report: NO  POC update due: (10 visits /OR AUTH LIMITS, whichever is less)  24                                            Medical History:  Comorbidities:  Relevant Medical History: Allergic Rhinitis, CALDERON, Dizziness, Loss of appetite, CAD, Anginal equivalent. Atherosclersosis s/p balloon angiplasty, thrombectomy, and stenting of left SFA x 3 on 24, Tobacco dependence                                         Precautions/ Contra-indications:           Latex allergy:  NO  Pacemaker:    NO  Contraindications for Manipulation: None  Date of Surgery: 24 , WBAT   Other:    Red Flags:  None    Suicide Screening:   The patient did not verbalize a primary behavioral concern, suicidal ideation, suicidal intent, or

## 2025-01-02 ENCOUNTER — HOSPITAL ENCOUNTER (OUTPATIENT)
Dept: PHYSICAL THERAPY | Age: 75
Setting detail: THERAPIES SERIES
Discharge: HOME | End: 2025-01-02
Payer: MEDICARE

## 2025-01-02 PROCEDURE — 97530 THERAPEUTIC ACTIVITIES: CPT

## 2025-01-02 PROCEDURE — 97110 THERAPEUTIC EXERCISES: CPT

## 2025-01-02 NOTE — FLOWSHEET NOTE
[facility] - Outpatient Rehabilitation and Therapy [address], [place] [address] office: [phone] fax: [phone]    Physical Therapy: TREATMENT/PROGRESS NOTE   Patient: Ezra Gonzalez (74 y.o. male)   Examination Date: 2025   :  1950 MRN: [id number]   Visit #: 17   Insurance Allowable Auth Needed   36/year []Yes    [x]No    Insurance: Payor: [facility] MEDICARE / Plan: UNITEDHEALTHCARE DUAL COMPLETE / Product Type: *No Product type* /   Insurance ID: [id number] - (Medicare Managed)  Secondary Insurance (if applicable): MEDICAID [place]   Treatment Diagnosis:     ICD-10-CM    1. Generalized weakness  R53.1       2. Gait abnormality  R26.9       3. At risk for falls  Z91.81       4. Decreased strength, endurance, and mobility  R53.1     Z74.09     R68.89       5. Decreased strength of lower extremity  R29.898       6. Decreased activities of daily living (ADL)  Z78.9          Medical Diagnosis:  Displaced subtrochanteric fracture of left femur, initial encounter for closed fracture [S72.22XA]   Referring Physician: April Smith APRN -*  PCP: Jazmin Noyola (Inactive)     Plan of care signed (Y/N): Y    Date of Patient follow up with Physician:      Plan of Care Report: NO  POC update due: (10 visits /OR AUTH LIMITS, whichever is less)  24                                            Medical History:  Comorbidities:  Relevant Medical History: Allergic Rhinitis, CALDERON, Dizziness, Loss of appetite, CAD, Anginal equivalent. Atherosclersosis s/p balloon angiplasty, thrombectomy, and stenting of left SFA x 3 on 24, Tobacco dependence                                         Precautions/ Contra-indications:           Latex allergy:  NO  Pacemaker:    NO  Contraindications for Manipulation: None  Date of Surgery: 24 , WBAT   Other:    Red Flags:  None    Suicide Screening:   The patient did not verbalize a primary behavioral concern, suicidal ideation, suicidal intent, or

## 2025-01-06 RX ORDER — ASPIRIN 81 MG/1
81 TABLET, COATED ORAL DAILY
Qty: 90 TABLET | Refills: 3 | Status: SHIPPED | OUTPATIENT
Start: 2025-01-06

## 2025-01-06 NOTE — TELEPHONE ENCOUNTER
Last OV: 24 WAK  Next OV: 3/12/25 WAK   Last labs: 24 CBC, Ferritin and iron  Last EK24 ( 24 stress test)  Last filled:  Disp Refills Start End     ASPIRIN LOW DOSE 81 MG EC tablet 90 tablet 1 2024 --    Sig - Route: TAKE 1 TABLET BY MOUTH EVERY DAY - Oral    Sent to pharmacy as: Aspirin Low Dose 81 MG Oral Tablet Delayed Release (aspirin)    E-Prescribing Status: Receipt confirmed by pharmacy (2024  5:16 PM EDT)         From last OV dated 24:  Summary of Assessment and Plan:  24     No medication changes today - Refills sent for Metoprolol and Crestor  - Advised to continue Aspirin and Plavix and to check with Dr. Morton to see if he needed to continue taking the Cilostazol  Labs soon - CBC, Iron & TIBC, Ferritin  Continue risk factor modifications  Call for any new or worsening symptoms

## 2025-01-07 ENCOUNTER — HOSPITAL ENCOUNTER (OUTPATIENT)
Dept: PHYSICAL THERAPY | Age: 75
Setting detail: THERAPIES SERIES
End: 2025-01-07
Payer: MEDICARE

## 2025-01-09 ENCOUNTER — HOSPITAL ENCOUNTER (OUTPATIENT)
Dept: PHYSICAL THERAPY | Age: 75
Setting detail: THERAPIES SERIES
Discharge: HOME | End: 2025-01-09
Payer: MEDICARE

## 2025-01-09 PROCEDURE — 97110 THERAPEUTIC EXERCISES: CPT

## 2025-01-09 PROCEDURE — 97530 THERAPEUTIC ACTIVITIES: CPT

## 2025-01-09 NOTE — FLOWSHEET NOTE
indicated to include: Passive Range of Motion  Modalities as needed that may include: Thermal Agents and Vasoneumatic Compression  Patient education on joint protection, activity modification, and progression of HEP    Plan: Cont POC- Continue emphasis/focus on exercise progression and improving proper muscle recruitment and activation/motor control patterns. Next visit plan to progress weights, progress reps, and add new exercises  Balance, gait , improve weight bearing tolerance. Focus on normalize gait pattern.  [employer] ok on sat but only machines he's started here with. Advised  Minimal TM use presently due to poor strength/ endurance     Electronically Signed by Sandip May, PT  PT  Date: 01/09/2025     Note: Portions of this note have been templated and/or copied from initial evaluation, reassessments and prior notes for documentation efficiency.    Note: If patient does not return for scheduled/recommended follow up visits, this note will serve as a discharge from care along with the most recent update on progress.

## 2025-01-14 ENCOUNTER — APPOINTMENT (OUTPATIENT)
Dept: PHYSICAL THERAPY | Age: 75
End: 2025-01-14
Payer: MEDICARE

## 2025-01-16 ENCOUNTER — HOSPITAL ENCOUNTER (OUTPATIENT)
Dept: PHYSICAL THERAPY | Age: 75
Setting detail: THERAPIES SERIES
Discharge: HOME | End: 2025-01-16
Payer: MEDICARE

## 2025-01-16 PROCEDURE — 97110 THERAPEUTIC EXERCISES: CPT

## 2025-01-16 PROCEDURE — 97530 THERAPEUTIC ACTIVITIES: CPT

## 2025-01-16 NOTE — FLOWSHEET NOTE
[facility] - Outpatient Rehabilitation and Therapy [address], [place] [address] office: [phone] fax: [phone]    Physical Therapy: TREATMENT/PROGRESS NOTE   Patient: Ezra Gonzalez (74 y.o. male)   Examination Date: 2025   :  1950 MRN: [id number]   Visit #: 19   Insurance Allowable Auth Needed   36/year []Yes    [x]No    Insurance: Payor: [facility] MEDICARE / Plan: UNITEDHEALTHCARE DUAL COMPLETE / Product Type: *No Product type* /   Insurance ID: [id number] - (Medicare Managed)  Secondary Insurance (if applicable): MEDICAID [place]   Treatment Diagnosis:     ICD-10-CM    1. Generalized weakness  R53.1       2. Gait abnormality  R26.9       3. At risk for falls  Z91.81       4. Decreased strength, endurance, and mobility  R53.1     Z74.09     R68.89       5. Decreased strength of lower extremity  R29.898       6. Decreased activities of daily living (ADL)  Z78.9          Medical Diagnosis:  Displaced subtrochanteric fracture of left femur, initial encounter for closed fracture [S72.22XA]   Referring Physician: April Smith APRN -*  PCP: Jazmin Noyola (Inactive)     Plan of care signed (Y/N): Y    Date of Patient follow up with Physician:      Plan of Care Report: NO  POC update due: (10 visits /OR AUTH LIMITS, whichever is less)  24, next visit                                             Medical History:  Comorbidities:  Relevant Medical History: Allergic Rhinitis, CALDERON, Dizziness, Loss of appetite, CAD, Anginal equivalent. Atherosclersosis s/p balloon angiplasty, thrombectomy, and stenting of left SFA x 3 on 24, Tobacco dependence                                         Precautions/ Contra-indications:           Latex allergy:  NO  Pacemaker:    NO  Contraindications for Manipulation: None  Date of Surgery: 24 , WBAT   Other:    Red Flags:  None    Suicide Screening:   The patient did not verbalize a primary behavioral concern, suicidal ideation, suicidal intent,

## 2025-01-17 ENCOUNTER — TELEPHONE (OUTPATIENT)
Dept: CARDIOLOGY CLINIC | Age: 75
End: 2025-01-17

## 2025-01-17 NOTE — TELEPHONE ENCOUNTER
Called spoke with patient he was wanting to make sure he is still on the same medications when he last seen WALLACE. I went through all his medication with him and went over his lab results from 12/20. Advised patient to call office back if he had and needs. Patient verbalized understanding.

## 2025-01-17 NOTE — TELEPHONE ENCOUNTER
Please reach out to patient, he saw a doctor today and when he went over medications, he thought there was an issue.     Please reach out for med clarification.

## 2025-01-21 ENCOUNTER — HOSPITAL ENCOUNTER (OUTPATIENT)
Dept: PHYSICAL THERAPY | Age: 75
Setting detail: THERAPIES SERIES
Discharge: HOME | End: 2025-01-21
Payer: MEDICARE

## 2025-01-21 PROCEDURE — 97530 THERAPEUTIC ACTIVITIES: CPT

## 2025-01-21 PROCEDURE — 97110 THERAPEUTIC EXERCISES: CPT

## 2025-01-21 NOTE — FLOWSHEET NOTE
[facility] - Outpatient Rehabilitation and Therapy [address], [place] [address] office: [phone] fax: [phone]    Physical Therapy: TREATMENT/PROGRESS NOTE   Patient: Ezra Gonzalez (74 y.o. male)   Examination Date: 2025   :  1950 MRN: [id number]   Visit #: 20   Insurance Allowable Auth Needed   36/year []Yes    [x]No    Insurance: Payor: [facility] MEDICARE / Plan: UNITEDHEALTHCARE DUAL COMPLETE / Product Type: *No Product type* /   Insurance ID: [id number] - (Medicare Managed)  Secondary Insurance (if applicable): MEDICAID [place]   Treatment Diagnosis:     ICD-10-CM    1. Generalized weakness  R53.1       2. Gait abnormality  R26.9       3. At risk for falls  Z91.81       4. Decreased strength, endurance, and mobility  R53.1     Z74.09     R68.89       5. Decreased strength of lower extremity  R29.898       6. Decreased activities of daily living (ADL)  Z78.9          Medical Diagnosis:  Displaced subtrochanteric fracture of left femur, initial encounter for closed fracture [S72.22XA]   Referring Physician: April Smith APRN -*  PCP: Jazmin Noyola (Inactive)     Plan of care signed (Y/N): Y    Date of Patient follow up with Physician:      Plan of Care Report: NO  POC update due: (10 visits /OR AUTH LIMITS, whichever is less)  24, next visit                                             Medical History:  Comorbidities:  Relevant Medical History: Allergic Rhinitis, CALDERON, Dizziness, Loss of appetite, CAD, Anginal equivalent. Atherosclersosis s/p balloon angiplasty, thrombectomy, and stenting of left SFA x 3 on 24, Tobacco dependence                                         Precautions/ Contra-indications:           Latex allergy:  NO  Pacemaker:    NO  Contraindications for Manipulation: None  Date of Surgery: 24 , WBAT   Other:    Red Flags:  None    Suicide Screening:   The patient did not verbalize a primary behavioral concern, suicidal ideation, suicidal intent,

## 2025-01-23 ENCOUNTER — HOSPITAL ENCOUNTER (OUTPATIENT)
Dept: PHYSICAL THERAPY | Age: 75
Setting detail: THERAPIES SERIES
Discharge: HOME | End: 2025-01-23
Payer: MEDICARE

## 2025-01-23 PROCEDURE — 97110 THERAPEUTIC EXERCISES: CPT

## 2025-01-23 PROCEDURE — 97530 THERAPEUTIC ACTIVITIES: CPT

## 2025-01-23 NOTE — FLOWSHEET NOTE
[facility] - Outpatient Rehabilitation and Therapy [address], [place] [address] office: [phone] fax: [phone]    Physical Therapy: TREATMENT/PROGRESS NOTE   Patient: Ezra Gonzalez (74 y.o. male)   Examination Date: 2025   :  1950 MRN: [id number]   Visit #: 21   Insurance Allowable Auth Needed   36/year []Yes    [x]No    Insurance: Payor: [facility] MEDICARE / Plan: UNITEDHEALTHCARE DUAL COMPLETE / Product Type: *No Product type* /   Insurance ID: [id number] - (Medicare Managed)  Secondary Insurance (if applicable): MEDICAID [place]   Treatment Diagnosis:     ICD-10-CM    1. Generalized weakness  R53.1       2. Gait abnormality  R26.9       3. At risk for falls  Z91.81       4. Decreased strength, endurance, and mobility  R53.1     Z74.09     R68.89       5. Decreased strength of lower extremity  R29.898       6. Decreased activities of daily living (ADL)  Z78.9          Medical Diagnosis:  Displaced subtrochanteric fracture of left femur, initial encounter for closed fracture [S72.22XA]   Referring Physician: April Smith APRN -*  PCP: Jazmin Noyola (Inactive)     Plan of care signed (Y/N): Y    Date of Patient follow up with Physician:      Plan of Care Report: NO  POC update due: (10 visits /OR AUTH LIMITS, whichever is less)  24, next visit                                             Medical History:  Comorbidities:  Relevant Medical History: Allergic Rhinitis, CALDERON, Dizziness, Loss of appetite, CAD, Anginal equivalent. Atherosclersosis s/p balloon angiplasty, thrombectomy, and stenting of left SFA x 3 on 24, Tobacco dependence                                         Precautions/ Contra-indications:           Latex allergy:  NO  Pacemaker:    NO  Contraindications for Manipulation: None  Date of Surgery: 24 , WBAT   Other:    Red Flags:  None    Suicide Screening:   The patient did not verbalize a primary behavioral concern, suicidal ideation, suicidal intent,

## 2025-01-28 ENCOUNTER — APPOINTMENT (OUTPATIENT)
Dept: PHYSICAL THERAPY | Age: 75
End: 2025-01-28
Payer: MEDICARE

## 2025-01-30 ENCOUNTER — HOSPITAL ENCOUNTER (OUTPATIENT)
Dept: PHYSICAL THERAPY | Age: 75
Setting detail: THERAPIES SERIES
Discharge: HOME | End: 2025-01-30
Payer: MEDICARE

## 2025-01-30 PROCEDURE — 97530 THERAPEUTIC ACTIVITIES: CPT

## 2025-01-30 PROCEDURE — 97110 THERAPEUTIC EXERCISES: CPT

## 2025-01-30 NOTE — FLOWSHEET NOTE
[facility] - Outpatient Rehabilitation and Therapy [address], [place] [address] office: [phone] fax: [phone]    Physical Therapy: TREATMENT/PROGRESS NOTE   Patient: Ezra Gonzalez (74 y.o. male)   Examination Date: 2025   :  1950 MRN: [id number]   Visit #: 22   Insurance Allowable Auth Needed   36/year []Yes    [x]No    Insurance: Payor: [facility] MEDICARE / Plan: UNITEDHEALTHCARE DUAL COMPLETE / Product Type: *No Product type* /   Insurance ID: [id number] - (Medicare Managed)  Secondary Insurance (if applicable): MEDICAID [place]   Treatment Diagnosis:     ICD-10-CM    1. Generalized weakness  R53.1       2. Gait abnormality  R26.9       3. At risk for falls  Z91.81       4. Decreased strength, endurance, and mobility  R53.1     Z74.09     R68.89       5. Decreased strength of lower extremity  R29.898       6. Decreased activities of daily living (ADL)  Z78.9          Medical Diagnosis:  Displaced subtrochanteric fracture of left femur, initial encounter for closed fracture [S72.22XA]   Referring Physician: April Smith APRN -*  PCP: Jazmin Noyola (Inactive)     Plan of care signed (Y/N): Y    Date of Patient follow up with Physician:      Plan of Care Report: NO  POC update due: (10 visits /OR AUTH LIMITS, whichever is less)  24, next visit                                             Medical History:  Comorbidities:  Relevant Medical History: Allergic Rhinitis, CALDERON, Dizziness, Loss of appetite, CAD, Anginal equivalent. Atherosclersosis s/p balloon angiplasty, thrombectomy, and stenting of left SFA x 3 on 24, Tobacco dependence                                         Precautions/ Contra-indications:           Latex allergy:  NO  Pacemaker:    NO  Contraindications for Manipulation: None  Date of Surgery: 24 , WBAT   Other:    Red Flags:  None    Suicide Screening:   The patient did not verbalize a primary behavioral concern, suicidal ideation, suicidal intent,

## 2025-02-04 ENCOUNTER — HOSPITAL ENCOUNTER (OUTPATIENT)
Dept: PHYSICAL THERAPY | Age: 75
Setting detail: THERAPIES SERIES
Discharge: HOME | End: 2025-02-04
Payer: MEDICARE

## 2025-02-04 PROCEDURE — 97530 THERAPEUTIC ACTIVITIES: CPT

## 2025-02-04 PROCEDURE — 97110 THERAPEUTIC EXERCISES: CPT

## 2025-02-04 NOTE — PLAN OF CARE
[facility] - Outpatient Rehabilitation and Therapy [address], [place] [address] office: [phone] fax: [phone]    Physical Therapy: TREATMENT/PROGRESS NOTE   Patient: Ezra Gonzalez (74 y.o. male)   Examination Date: 2025   :  1950 MRN: [id number]   Visit #: 23   Insurance Allowable Auth Needed   36/year []Yes    [x]No    Insurance: Payor: [facility] MEDICARE / Plan: UNITEDHEALTHCARE DUAL COMPLETE / Product Type: *No Product type* /   Insurance ID: [id number] - (Medicare Managed)  Secondary Insurance (if applicable): MEDICAID [place]   Treatment Diagnosis:     ICD-10-CM    1. Generalized weakness  R53.1       2. Gait abnormality  R26.9       3. At risk for falls  Z91.81       4. Decreased strength, endurance, and mobility  R53.1     Z74.09     R68.89       5. Decreased strength of lower extremity  R29.898       6. Decreased activities of daily living (ADL)  Z78.9          Medical Diagnosis:  Displaced subtrochanteric fracture of left femur, initial encounter for closed fracture [S72.22XA]   Referring Physician: April Smith APRN -*  PCP: Jazmin Noyola (Inactive)     Plan of care signed (Y/N): Y    Date of Patient follow up with Physician:      Plan of Care Report: YES, Date Range for this report:  to   POC update due: (10 visits /OR AUTH LIMITS, whichever is less)  3/                                            Medical History:  Comorbidities:  Relevant Medical History: Allergic Rhinitis, CALDERON, Dizziness, Loss of appetite, CAD, Anginal equivalent. Atherosclersosis s/p balloon angiplasty, thrombectomy, and stenting of left SFA x 3 on 24, Tobacco dependence                                         Precautions/ Contra-indications:           Latex allergy:  NO  Pacemaker:    NO  Contraindications for Manipulation: None  Date of Surgery: 24 , WBAT   Other:    Red Flags:  None    Suicide Screening:   The patient did not verbalize a primary behavioral concern, suicidal

## 2025-02-06 ENCOUNTER — HOSPITAL ENCOUNTER (OUTPATIENT)
Dept: PHYSICAL THERAPY | Age: 75
Setting detail: THERAPIES SERIES
Discharge: HOME | End: 2025-02-06
Payer: MEDICARE

## 2025-02-06 PROCEDURE — 97110 THERAPEUTIC EXERCISES: CPT

## 2025-02-06 PROCEDURE — 97530 THERAPEUTIC ACTIVITIES: CPT

## 2025-02-06 NOTE — FLOWSHEET NOTE
[facility] - Outpatient Rehabilitation and Therapy [address], [place] [address] office: [phone] fax: [phone]    Physical Therapy: TREATMENT/PROGRESS NOTE   Patient: Ezra Gonzalez (74 y.o. male)   Examination Date: 2025   :  1950 MRN: [id number]   Visit #: 24   Insurance Allowable Auth Needed   36/year []Yes    [x]No    Insurance: Payor: [facility] MEDICARE / Plan: UNITEDHEALTHCARE DUAL COMPLETE / Product Type: *No Product type* /   Insurance ID: [id number] - (Medicare Managed)  Secondary Insurance (if applicable): MEDICAID [place]   Treatment Diagnosis:     ICD-10-CM    1. Generalized weakness  R53.1       2. Gait abnormality  R26.9       3. At risk for falls  Z91.81       4. Decreased strength, endurance, and mobility  R53.1     Z74.09     R68.89       5. Decreased strength of lower extremity  R29.898       6. Decreased activities of daily living (ADL)  Z78.9          Medical Diagnosis:  Displaced subtrochanteric fracture of left femur, initial encounter for closed fracture [S72.22XA]   Referring Physician: April Smith APRN -*  PCP: Jazmin Noyola (Inactive)     Plan of care signed (Y/N): N    Date of Patient follow up with Physician:      Plan of Care Report: NO  POC update due: (10 visits /OR AUTH LIMITS, whichever is less)  3/4                                            Medical History:  Comorbidities:  Relevant Medical History: Allergic Rhinitis, CALDERON, Dizziness, Loss of appetite, CAD, Anginal equivalent. Atherosclersosis s/p balloon angiplasty, thrombectomy, and stenting of left SFA x 3 on 24, Tobacco dependence                                         Precautions/ Contra-indications:           Latex allergy:  NO  Pacemaker:    NO  Contraindications for Manipulation: None  Date of Surgery: 24 , WBAT   Other:    Red Flags:  None    Suicide Screening:   The patient did not verbalize a primary behavioral concern, suicidal ideation, suicidal intent, or demonstrate

## 2025-02-11 ENCOUNTER — HOSPITAL ENCOUNTER (OUTPATIENT)
Dept: PHYSICAL THERAPY | Age: 75
Setting detail: THERAPIES SERIES
Discharge: HOME | End: 2025-02-11
Payer: MEDICARE

## 2025-02-11 PROCEDURE — 97530 THERAPEUTIC ACTIVITIES: CPT

## 2025-02-11 PROCEDURE — 97110 THERAPEUTIC EXERCISES: CPT

## 2025-02-11 NOTE — FLOWSHEET NOTE
[facility] - Outpatient Rehabilitation and Therapy [address], [place] [address] office: [phone] fax: [phone]    Physical Therapy: TREATMENT/PROGRESS NOTE   Patient: Ezra Gonzalez (74 y.o. male)   Examination Date: 2025   :  1950 MRN: [id number]   Visit #: 25   Insurance Allowable Auth Needed   36/year []Yes    [x]No    Insurance: Payor: [facility] MEDICARE / Plan: UNITEDHEALTHCARE DUAL COMPLETE / Product Type: *No Product type* /   Insurance ID: [id number] - (Medicare Managed)  Secondary Insurance (if applicable): MEDICAID [place]   Treatment Diagnosis:     ICD-10-CM    1. Generalized weakness  R53.1       2. Gait abnormality  R26.9       3. At risk for falls  Z91.81       4. Decreased strength, endurance, and mobility  R53.1     Z74.09     R68.89       5. Decreased strength of lower extremity  R29.898       6. Decreased activities of daily living (ADL)  Z78.9          Medical Diagnosis:  Displaced subtrochanteric fracture of left femur, initial encounter for closed fracture [S72.22XA]   Referring Physician: April Smith APRN -*  PCP: Jazmin Noyola (Inactive)     Plan of care signed (Y/N): N    Date of Patient follow up with Physician:      Plan of Care Report: NO  POC update due: (10 visits /OR AUTH LIMITS, whichever is less)  3/4                                            Medical History:  Comorbidities:  Relevant Medical History: Allergic Rhinitis, CALDERON, Dizziness, Loss of appetite, CAD, Anginal equivalent. Atherosclersosis s/p balloon angiplasty, thrombectomy, and stenting of left SFA x 3 on 24, Tobacco dependence                                         Precautions/ Contra-indications:           Latex allergy:  NO  Pacemaker:    NO  Contraindications for Manipulation: None  Date of Surgery: 24 , WBAT   Other:    Red Flags:  None    Suicide Screening:   The patient did not verbalize a primary behavioral concern, suicidal ideation, suicidal intent, or demonstrate

## 2025-02-12 RX ORDER — CLOPIDOGREL BISULFATE 75 MG/1
75 TABLET ORAL DAILY
Qty: 30 TABLET | Refills: 3 | Status: SHIPPED | OUTPATIENT
Start: 2025-02-12

## 2025-02-12 NOTE — TELEPHONE ENCOUNTER
Received refill request for Clopidogrel(plavix ) 75 MG from [facility] [address] [facility] [address]  pharmacy.     Last OV: 11/19/24    Next OV: 03/12/25    Last Labs: CBC 11/02/24    Last Filled: 09/12/24

## 2025-02-13 ENCOUNTER — HOSPITAL ENCOUNTER (OUTPATIENT)
Dept: PHYSICAL THERAPY | Age: 75
Setting detail: THERAPIES SERIES
Discharge: HOME | End: 2025-02-13
Payer: MEDICARE

## 2025-02-13 PROCEDURE — 97530 THERAPEUTIC ACTIVITIES: CPT

## 2025-02-13 PROCEDURE — 97110 THERAPEUTIC EXERCISES: CPT

## 2025-02-13 NOTE — PLAN OF CARE
ambulation without evidence of antalgia to restore PLOF activities and allow him to initiate riding a motorcycle   [] Progressing: [x] Met: [] Not Met: [] Adjusted     Overall Progression Towards Functional goals/ Treatment Progress Update:  [] Patient is progressing as expected towards functional goals listed.    [x] Progression is slowed due to complexities/Impairments listed / lack of compliance with HEP during time off from therapy.  [] Progression has been slowed due to co-morbidities.  [] Plan just implemented, too soon (<30days) to assess goals progression   [] Goals require adjustment due to lack of progress  [] Patient is not progressing as expected and requires additional follow up with physician  [] Other:     TREATMENT PLAN     Frequency/Duration: 1-2x/week for 8-10 weeks for the following treatment interventions:    Interventions:  Therapeutic Exercise (68714) including: strength training, ROM, and functional mobility  Therapeutic Activities (63706) including: functional mobility training and education.  Neuromuscular Re-education (71248) activation and proprioception, including postural re-education.    Gait Training (49824) for normalization of ambulation patterns and AD training.   Manual Therapy (13072) as indicated to include: Passive Range of Motion  Modalities as needed that may include: Thermal Agents and Vasoneumatic Compression  Patient education on joint protection, activity modification, and progression of HEP    Plan: Discharge     Electronically Signed by Sandip May PT, PT Date: 02/13/2025     Note: Portions of this note have been templated and/or copied from initial evaluation, reassessments and prior notes for documentation efficiency.    Note: If patient does not return for scheduled/recommended follow up visits, this note will serve as a discharge from care along with the most recent update on progress.

## 2025-03-03 NOTE — PROGRESS NOTES
[facility]      CARDIAC FOLLOW UP  [phone]  3/12/25  Referring: Dr. Jazmin Noyola    REASON FOR CONSULT/CHIEF COMPLAINT/HPI     Reason for visit/ Chief complaint  Dyspnea on Exertion  Dizziness Claudication  CAD   HPI Ezra Gonzalez is a 74 y.o. male patient here for follow up of CAD and PAD.   He is doing much better and is planning on taking a major hiking trip soon through the [facility].    I met him initially in June 22' for consultation for a variety of symptoms including shortness of breath and dizziness.  Claudication has been his main symptom.  He was found to have CAD and had a stent in 2023.  He recently had leg intervention with Dr. Morton, and his claudication improved markedly.    He had severe epistaxis requiring an ER visit, ENT consult, and temporary holding of his antiplatelet therapy a few months ago, prior to our last visit, but no nosebleeds since then.  DAPT was restarted shortly after this with no problems since.    Today, Ezra is here for a 6 month follow up.  States that he is doing very well.  He feels like he has recovered about 90% from his broken hip.  He is walking good.  He continues to go to the [facility] for exercise and he does a lot of his PT exercises while he is there.  He will be going to the [facility] here soon.  Denies having anymore nose bleeds.  He has cut down on smoking cigarettes to about 2 packs per week.  Patient denies exertional chest pain, CALDERON/PND, palpitations, light-headedness, edema.    Patient is adherent with medications and is tolerating them well without side effects     HISTORY/ALLERGIES/ROS     MedHx: Tobacco abuse, CAD, PVC  SurgHx:  has a past surgical history that includes Diagnostic Cardiac Cath Lab Procedure; Arm Surgery; Cataract extraction; Cardiac procedure (Left, 05/30/2024); Cardiac procedure (Right, 10/03/2024); invasive vascular (N/A, 10/03/2024); and hip surgery (2024).   SocHx:  reports that he has been smoking cigarettes.

## 2025-03-12 ENCOUNTER — OFFICE VISIT (OUTPATIENT)
Dept: CARDIOLOGY CLINIC | Age: 75
End: 2025-03-12

## 2025-03-12 VITALS
HEIGHT: 69 IN | HEART RATE: 74 BPM | WEIGHT: 146.6 LBS | BODY MASS INDEX: 21.71 KG/M2 | SYSTOLIC BLOOD PRESSURE: 130 MMHG | OXYGEN SATURATION: 98 % | DIASTOLIC BLOOD PRESSURE: 64 MMHG

## 2025-03-12 DIAGNOSIS — I73.9 PAD (PERIPHERAL ARTERY DISEASE): ICD-10-CM

## 2025-03-12 DIAGNOSIS — Z72.0 TOBACCO ABUSE: ICD-10-CM

## 2025-03-12 DIAGNOSIS — I25.10 CAD IN NATIVE ARTERY: Primary | ICD-10-CM

## 2025-03-12 DIAGNOSIS — I47.19 ATRIAL TACHYCARDIA: ICD-10-CM

## 2025-03-12 RX ORDER — METOPROLOL SUCCINATE 25 MG/1
25 TABLET, EXTENDED RELEASE ORAL DAILY
Qty: 90 TABLET | Refills: 3 | Status: SHIPPED | OUTPATIENT
Start: 2025-03-12

## 2025-03-12 RX ORDER — ROSUVASTATIN CALCIUM 20 MG/1
20 TABLET, COATED ORAL DAILY
Qty: 90 TABLET | Refills: 3 | Status: SHIPPED | OUTPATIENT
Start: 2025-03-12

## 2025-03-12 RX ORDER — CLOPIDOGREL BISULFATE 75 MG/1
75 TABLET ORAL DAILY
Qty: 90 TABLET | Refills: 3 | Status: SHIPPED | OUTPATIENT
Start: 2025-03-12

## 2025-03-12 RX ORDER — TRAZODONE HYDROCHLORIDE 50 MG/1
25-50 TABLET ORAL NIGHTLY
COMMUNITY
Start: 2025-03-04 | End: 2025-05-03

## 2025-03-12 NOTE — PATIENT INSTRUCTIONS
No medication changes today - Refills sent for 90 day supply    Continue to work on smoking cessation    Follow up with Dr. Au in 6 months

## 2025-04-10 ENCOUNTER — CLINICAL DOCUMENTATION (OUTPATIENT)
Dept: CASE MANAGEMENT | Age: 75
End: 2025-04-10

## 2025-04-10 NOTE — PROGRESS NOTES
Patient meets lung screening criteria. Patient due for annual CT Lung Screening. First reminder letter mailed. If ordered, Patient may call [phone] to schedule.     Lung Screen Criteria  Age 50-80  Current smoker or quit within the last 15 years  Has => 20 pack year history.    Future Appointments   Date Time Provider Department Center   5/14/2025 11:15 AM Minesh Morton MD [facility]   9/12/2025 10:30 AM New Au MD [facility]       Active Lung Screen order on chart: No, routed message to PCP

## 2025-04-22 DIAGNOSIS — I70.213 ATHEROSCLEROSIS OF NATIVE ARTERY OF BOTH LOWER EXTREMITIES WITH INTERMITTENT CLAUDICATION: ICD-10-CM

## 2025-04-22 RX ORDER — CILOSTAZOL 100 MG/1
100 TABLET ORAL 2 TIMES DAILY
Qty: 60 TABLET | Refills: 3 | Status: SHIPPED | OUTPATIENT
Start: 2025-04-22

## 2025-04-28 PROBLEM — I47.19 ATRIAL TACHYCARDIA: Status: ACTIVE | Noted: 2025-04-28

## 2025-04-28 PROBLEM — Z72.0 TOBACCO ABUSE: Status: ACTIVE | Noted: 2023-07-05

## 2025-04-28 NOTE — PROGRESS NOTES
Calcium      [facility]      Last appt was 7/19/2021 collaterals to LAD  LVEF- 50%  LVG- inferobasal akinesis  LVEDP- 2    CTA   CTA Abd Aorta with Runoff 7/3/23  IMPRESSION:  Diffuse atherosclerotic disease of the abdominal aorta with no aneurysm or  significant narrowing.   On the right, diffuse disease with moderate multifocal areas of narrowing in  the proximal to mid SFA and mid popliteal artery.  2 vessel runoff with  chronic occlusion of the posterior tibial artery.   On the left, moderate areas of narrowing in the mid SFA in suspected  three-vessel runoff on the left however posterior tibial artery is small and  difficult to evaluate but does appear to be patent to the level of the ankle.   Milder atherosclerotic disease as above   13 mm hypodense lesion within the liver which appears to represent  hemangioma.  Correlation with any outside imaging is recommended.  If none is  available, CT liver mass protocol could be performed for further evaluation.     CTA Cardiac 7/3/23  IMPRESSION:  1. Total calcium score is 1214, placing the patient at the 80th percentile.  2. Left main coronary artery: Widely patent left main coronary artery.  3. LAD: 50-69% stenosis noted in the proximal to mid LAD.  Distal LAD is  patent.  4. Tiny ramus intermedius coronary artery, this is widely patent.  5. Left circumflex coronary artery: Overall less than 50% stenosis in the  proximal left circumflex coronary artery, with a 3 mm short segment 50-69%  stenosis in the mid left circumflex coronary artery.  6. RCA: Greater than 70% narrowing noted in the proximal to mid right  coronary artery.  Short-segment less than 50% narrowing in the distal RCA.  7. Small patent PDA.  8. Estimated left ventricle ejection fraction 64%.     RECOMMENDATIONS:  CAD-RADS 4: 70-99% stenosis or greater than 50% stenosis at the left  main-consider invasive coronary angiography or at least functional assessment  (in the absence of left main disease or three-vessel disease).  Consider  symptom-guided

## 2025-04-29 ENCOUNTER — ANCILLARY PROCEDURE (OUTPATIENT)
Dept: CARDIOLOGY CLINIC | Age: 75
End: 2025-04-29

## 2025-04-29 ENCOUNTER — OFFICE VISIT (OUTPATIENT)
Dept: CARDIOLOGY CLINIC | Age: 75
End: 2025-04-29
Payer: MEDICARE

## 2025-04-29 VITALS
OXYGEN SATURATION: 94 % | HEART RATE: 87 BPM | BODY MASS INDEX: 21.73 KG/M2 | HEIGHT: 69 IN | DIASTOLIC BLOOD PRESSURE: 48 MMHG | SYSTOLIC BLOOD PRESSURE: 128 MMHG | WEIGHT: 146.7 LBS

## 2025-04-29 DIAGNOSIS — I25.10 CORONARY ARTERY DISEASE INVOLVING NATIVE CORONARY ARTERY OF NATIVE HEART WITHOUT ANGINA PECTORIS: ICD-10-CM

## 2025-04-29 DIAGNOSIS — R42 DIZZINESS: Primary | ICD-10-CM

## 2025-04-29 DIAGNOSIS — Z72.0 TOBACCO ABUSE: ICD-10-CM

## 2025-04-29 DIAGNOSIS — R53.83 FATIGUE, UNSPECIFIED TYPE: ICD-10-CM

## 2025-04-29 DIAGNOSIS — I73.9 PAD (PERIPHERAL ARTERY DISEASE): ICD-10-CM

## 2025-04-29 DIAGNOSIS — R42 DIZZINESS: ICD-10-CM

## 2025-04-29 DIAGNOSIS — I25.10 CAD IN NATIVE ARTERY: ICD-10-CM

## 2025-04-29 DIAGNOSIS — R09.89 BRUIT OF LEFT CAROTID ARTERY: ICD-10-CM

## 2025-04-29 DIAGNOSIS — I47.19 ATRIAL TACHYCARDIA: ICD-10-CM

## 2025-04-29 LAB — ECHO BSA: 1.8 M2

## 2025-04-29 PROCEDURE — 1159F MED LIST DOCD IN RCRD: CPT | Performed by: INTERNAL MEDICINE

## 2025-04-29 PROCEDURE — 93000 ELECTROCARDIOGRAM COMPLETE: CPT | Performed by: INTERNAL MEDICINE

## 2025-04-29 PROCEDURE — 1123F ACP DISCUSS/DSCN MKR DOCD: CPT | Performed by: INTERNAL MEDICINE

## 2025-04-29 PROCEDURE — G8420 CALC BMI NORM PARAMETERS: HCPCS | Performed by: INTERNAL MEDICINE

## 2025-04-29 PROCEDURE — G8427 DOCREV CUR MEDS BY ELIG CLIN: HCPCS | Performed by: INTERNAL MEDICINE

## 2025-04-29 PROCEDURE — 4004F PT TOBACCO SCREEN RCVD TLK: CPT | Performed by: INTERNAL MEDICINE

## 2025-04-29 PROCEDURE — 99214 OFFICE O/P EST MOD 30 MIN: CPT | Performed by: INTERNAL MEDICINE

## 2025-04-29 RX ORDER — DIPHENHYDRAMINE HCL 25 MG
25 CAPSULE ORAL EVERY 6 HOURS PRN
COMMUNITY

## 2025-04-29 NOTE — PATIENT INSTRUCTIONS
Follow up with Dr Au in September     Follow up with Dr Stephen for your dizziness as well.    Echo and Carotid Doppler soon     30 day heart monitor     Call for any questions or concerns.

## 2025-04-29 NOTE — NURSING NOTE
MCOT requested for pt.  Device was registered and placed.Tutorial given, pt verbalized understanding.    Patch # [id number]

## 2025-05-13 NOTE — PROGRESS NOTES
Ezra Gonzalez   1950, 75 y.o. male   [id number]       Referring Provider: Fred Wise MD  Referral Type: In an order in Epic    Reason for Visit: Evaluation of suspected change in hearing, tinnitus, or balance.    ADULT AUDIOLOGIC EVALUATION      Ezra Gonzalez is a 75 y.o. male seen today, 5/15/2025 , for an initial audiologic evaluation.  Patient was seen by Fred Wise MD following today's evaluation.    AUDIOLOGIC AND OTHER PERTINENT MEDICAL HISTORY:      Ezra Gonzalez reports a history of loud noise exposure. Patient has been experiencing spells of dizziness and light-headedness. Patient denied syncope or history of headaches/migraines. He denied symptoms of true vertigo. The duration of each dizzy spell can vary.     He denied otalgia, aural fullness, otorrhea, history of falls, history of head trauma, history of ear surgery, and family history of hearing loss    Date: 5/15/2025     IMPRESSIONS:      Today's results revealed a bilateral sensorineural hearing loss    Excellent speech understanding when in quiet. Tympanometry indicates Right Ear normal middle ear function and Left Ear normal middle ear function.    Discussed test results and implications with patient. Discussed possible benefits of amplification.  Discussed noise exposure and the importance of appropriate hearing protection when in hazardous noise environments.    Follow medical recommendations of Fred Wise MD.    ASSESSMENT AND FINDINGS:     Otoscopy unremarkable.    RIGHT EAR:  Hearing Sensitivity: Normal sloping to Moderately-Severe Sensorineural hearing loss  Speech Recognition Threshold: 25 dB HL  Word Recognition: Excellent 100%, based on NU-6 by-difficulty list at 65 dBHL with 35 dB HL masking noise using recorded speech stimuli.    Tympanometry: Normal peak pressure and compliance, Type A tympanogram, consistent with normal middle ear function.       LEFT EAR:  Hearing Sensitivity: Mild sloping to Moderately-Severe

## 2025-05-14 ENCOUNTER — OFFICE VISIT (OUTPATIENT)
Dept: CARDIOLOGY CLINIC | Age: 75
End: 2025-05-14
Payer: MEDICARE

## 2025-05-14 VITALS
BODY MASS INDEX: 21.71 KG/M2 | SYSTOLIC BLOOD PRESSURE: 138 MMHG | HEART RATE: 71 BPM | DIASTOLIC BLOOD PRESSURE: 72 MMHG | WEIGHT: 147 LBS | OXYGEN SATURATION: 97 %

## 2025-05-14 DIAGNOSIS — I25.10 CORONARY ARTERY DISEASE INVOLVING NATIVE CORONARY ARTERY OF NATIVE HEART WITHOUT ANGINA PECTORIS: ICD-10-CM

## 2025-05-14 DIAGNOSIS — I73.9 PAD (PERIPHERAL ARTERY DISEASE): Primary | ICD-10-CM

## 2025-05-14 DIAGNOSIS — H91.90 HEARING LOSS, UNSPECIFIED HEARING LOSS TYPE, UNSPECIFIED LATERALITY: Primary | ICD-10-CM

## 2025-05-14 PROCEDURE — G8420 CALC BMI NORM PARAMETERS: HCPCS | Performed by: INTERNAL MEDICINE

## 2025-05-14 PROCEDURE — 1123F ACP DISCUSS/DSCN MKR DOCD: CPT | Performed by: INTERNAL MEDICINE

## 2025-05-14 PROCEDURE — 1159F MED LIST DOCD IN RCRD: CPT | Performed by: INTERNAL MEDICINE

## 2025-05-14 PROCEDURE — 99213 OFFICE O/P EST LOW 20 MIN: CPT | Performed by: INTERNAL MEDICINE

## 2025-05-14 PROCEDURE — G8427 DOCREV CUR MEDS BY ELIG CLIN: HCPCS | Performed by: INTERNAL MEDICINE

## 2025-05-14 PROCEDURE — 4004F PT TOBACCO SCREEN RCVD TLK: CPT | Performed by: INTERNAL MEDICINE

## 2025-05-14 NOTE — PROGRESS NOTES
Interventional Cardiology Consultation     Ezra Gonzalez  1950    PCP: Jazmin Noyola, APRN - CNP  Referring Physician: Dr. Au  Reason for Referral: PAD  Chief Complaint: \"I FEEL GREAT\"  Chief Complaint   Patient presents with    Follow-up     No cc       Subjective:   History of Present Illness: The patient is 75 y.o. male with a past medical history significant for CAD and HTN. Patient follows with Dr. Au for primary cardiology. History of peripheral artery disease with lifestyle limiting claudication. Previously followed with Dr. Mccann. Patient s/p successful balloon angioplasty, throbmectomy and stenting of the left SFA ( X 3) 5/30/24. S/p successful shockwave lithotripsy, balloon angioplasty and stenting  ( 7X 150 mm mid SFA) 10/3/24. Patient here today for follow up. Reports overall he is doing good. He denies any new or worsening symptoms. denies chest pain, PND, orthopnea, dyspnea at rest, palpitations, syncope or edema. Denies any new or worsening claudication or wounds        Past Medical History:   Diagnosis Date    CAD (coronary artery disease)     Glaucoma     Hypertension      Past Surgical History:   Procedure Laterality Date    ARM SURGERY      CARDIAC PROCEDURE Left 05/30/2024    Peripheral angiography performed by Minesh Morton MD at [facility] CARDIAC CATH LAB    CARDIAC PROCEDURE Right 10/03/2024    Peripheral angiography performed by Minesh Morton MD at [facility] CARDIAC CATH LAB    CATARACT EXTRACTION      DIAGNOSTIC CARDIAC CATH LAB PROCEDURE      HIP SURGERY  2024    INVASIVE VASCULAR N/A 10/03/2024    Angioplasty peripheral artery performed by Minesh Morton MD at [facility] CARDIAC CATH LAB     Family History   Problem Relation Age of Onset    Cancer Father      Social History     Tobacco Use    Smoking status: Every Day     Current packs/day: 0.25     Average packs/day: 1 pack/day for 56.5 years (55.4 ttl pk-yrs)     Types: Cigarettes     Start date: 12/1/2023     Passive

## 2025-05-15 ENCOUNTER — PROCEDURE VISIT (OUTPATIENT)
Dept: AUDIOLOGY | Age: 75
End: 2025-05-15
Payer: MEDICARE

## 2025-05-15 ENCOUNTER — OFFICE VISIT (OUTPATIENT)
Dept: ENT CLINIC | Age: 75
End: 2025-05-15

## 2025-05-15 DIAGNOSIS — H90.3 SENSORINEURAL HEARING LOSS (SNHL) OF BOTH EARS: ICD-10-CM

## 2025-05-15 DIAGNOSIS — H90.3 SENSORINEURAL HEARING LOSS (SNHL) OF BOTH EARS: Primary | ICD-10-CM

## 2025-05-15 DIAGNOSIS — R42 DIZZINESS AND GIDDINESS: ICD-10-CM

## 2025-05-15 DIAGNOSIS — H81.10 BENIGN PAROXYSMAL POSITIONAL VERTIGO, UNSPECIFIED LATERALITY: Primary | ICD-10-CM

## 2025-05-15 PROCEDURE — 92567 TYMPANOMETRY: CPT

## 2025-05-15 PROCEDURE — 92557 COMPREHENSIVE HEARING TEST: CPT

## 2025-05-15 NOTE — PROGRESS NOTES
[facility]  DIVISION OF OTOLARYNGOLOGY- HEAD & NECK SURGERY  CONSULT      Patient Name: Ezra Gonzalez  Medical Record Number:  [id number]  Primary Care Physician:  Jazmin Noyola APRN - CNP  Date of Consultation: 5/15/2025    Chief Complaint: Dizziness        HISTORY OF PRESENT ILLNESS  Ezra is a(n) 75 y.o. male who presents for evaluation of dizziness.  The patient states been having dizzy episodes.  Somewhat difficult for him to describe the dizziness.  Sometimes it feels like spinning.  Sometimes he describes lightheadedness.            REVIEW OF SYSTEMS  As above    PHYSICAL EXAM  GENERAL: No Acute Distress, Alert and Oriented, no Hoarseness, strong voice  EYES: EOMI, Anti-icteric  HENT:   Head: Normocephalic and atraumatic.   Face:  Symmetric, facial nerve intact, no sinus tenderness  Right Ear: Normal external ear, normal external auditory canal, intact tympanic membrane with normal mobility and aerated middle ear  Left Ear: Normal external ear, normal external auditory canal, intact tympanic membrane with normal mobility and aerated middle ear  Mouth/Oral Cavity:  normal lips, Uvula is midline, no mucosal lesions, no trismus  Oropharynx/Larynx:  normal oropharynx,  Nose:Normal external nasal appearance.    NECK: Normal range of motion, no thyromegaly, trachea is midline, no lymphadenopathy, no neck masses, no crepitus  Neuro: Left-sided Mp-Hallpike showed significant nystagmus and subjective vertigo        He had an audiogram today that shows normal sloping to moderate-severe sensorineural hearing loss        ASSESSMENT/PLAN  1. Benign paroxysmal positional vertigo, unspecified laterality  He appears to have left BPPV.  This was at least responsible some of his dizziness.  He likely has other etiologies as well.  Recommended physical therapy.  - [facility]    2. Sensorineural hearing loss (SNHL) of both ears  Recommended hearing aid             I have performed a head

## 2025-05-19 ENCOUNTER — RESULTS FOLLOW-UP (OUTPATIENT)
Dept: CARDIOLOGY | Age: 75
End: 2025-05-19

## 2025-05-19 NOTE — TELEPHONE ENCOUNTER
----- Message from Dr. New Au MD sent at 5/19/2025 12:48 PM EDT -----  Stable.  Continue aspirin, plavix, statin  Repeat Carotid Dopplers in 12 months

## 2025-05-20 ENCOUNTER — HOSPITAL ENCOUNTER (OUTPATIENT)
Dept: PHYSICAL THERAPY | Age: 75
Setting detail: THERAPIES SERIES
Discharge: HOME | End: 2025-05-20
Attending: OTOLARYNGOLOGY
Payer: MEDICARE

## 2025-05-20 DIAGNOSIS — R26.89 POOR BALANCE: ICD-10-CM

## 2025-05-20 DIAGNOSIS — H81.12 BENIGN PAROXYSMAL POSITIONAL VERTIGO OF LEFT EAR: ICD-10-CM

## 2025-05-20 DIAGNOSIS — Z74.09 IMPAIRED MOBILITY AND ADLS: ICD-10-CM

## 2025-05-20 DIAGNOSIS — R42 DIZZINESS: Primary | ICD-10-CM

## 2025-05-20 DIAGNOSIS — Z78.9 IMPAIRED MOBILITY AND ADLS: ICD-10-CM

## 2025-05-20 PROCEDURE — 97161 PT EVAL LOW COMPLEX 20 MIN: CPT

## 2025-05-20 PROCEDURE — 97530 THERAPEUTIC ACTIVITIES: CPT

## 2025-05-20 PROCEDURE — 95992 CANALITH REPOSITIONING PROC: CPT

## 2025-05-20 NOTE — TELEPHONE ENCOUNTER
I spoke with pt and relayed Echo results per WALLACE. Pt verbalized understanding. Pt stated that he was treated for his inner ear problem and is doing much better. And will be finishing the monitor soon. He also said that another dr took him off a med that he was taking BID, but did not know the name of the med.

## 2025-05-22 ENCOUNTER — HOSPITAL ENCOUNTER (OUTPATIENT)
Dept: PHYSICAL THERAPY | Age: 75
Setting detail: THERAPIES SERIES
Discharge: HOME | End: 2025-05-22
Attending: OTOLARYNGOLOGY
Payer: MEDICARE

## 2025-05-22 PROCEDURE — 95992 CANALITH REPOSITIONING PROC: CPT

## 2025-05-22 PROCEDURE — 97530 THERAPEUTIC ACTIVITIES: CPT

## 2025-05-22 NOTE — FLOWSHEET NOTE
[facility] - Outpatient Rehabilitation and Therapy: [address], [address], [place] [address] office: [phone] fax: [phone]       Physical Therapy: TREATMENT/PROGRESS NOTE   Patient: Ezra Gonzalez (75 y.o. male)   Examination Date: 2025   :  1950 MRN: [id number]   Visit #: 2   Insurance Allowable Auth Needed    Auth not required []Yes    [x]No    Insurance: Payor: [facility] MEDICARE / Plan: [employer] DUAL COMPLETE / Product Type: *No Product type* /   Insurance ID: [id number] - (Medicare Managed)  Secondary Insurance (if applicable): MEDICAID [place]   Treatment Diagnosis:     ICD-10-CM    1. Dizziness  R42       2. Benign paroxysmal positional vertigo of left ear  H81.12       3. Poor balance  R26.89       4. Impaired mobility and ADLs  Z74.09     Z78.9          Medical Diagnosis:  Benign paroxysmal positional vertigo, unspecified laterality [H81.10]   Referring Physician: Fred Wise MD  PCP: Jazmin Noyola APRN - CNP       Plan of care signed (Y/N): sent for cosign  (received)    Date of Patient follow up with Physician:      Plan of Care Report: NO  POC update due: (10 visits /OR AUTH LIMITS, whichever is less)  2025                                             Medical History:  Comorbidities:  Anxiety  Depression  Other Cardiovascular Conditions: CAD, h/o 2 stents placed  Currently wearing a 30 day heart monitor                                         Precautions/ Contra-indications:           Latex allergy:  NO  Pacemaker:    NO  Contraindications for Manipulation: None  Date of Surgery: NA  Other:    Red Flags:  None    Suicide Screening:   The patient did not verbalize a primary behavioral concern, suicidal ideation, suicidal intent, or demonstrate suicidal behaviors.    Preferred Language for Healthcare:   [x] English       [] other:    SUBJECTIVE EXAMINATION     Patient stated complaint/comment: Pt reports over the last few months he has noticed he

## 2025-05-27 ENCOUNTER — HOSPITAL ENCOUNTER (OUTPATIENT)
Dept: PHYSICAL THERAPY | Age: 75
Setting detail: THERAPIES SERIES
Discharge: HOME | End: 2025-05-27
Attending: OTOLARYNGOLOGY
Payer: MEDICARE

## 2025-05-27 ENCOUNTER — TELEPHONE (OUTPATIENT)
Dept: CARDIOLOGY CLINIC | Age: 75
End: 2025-05-27

## 2025-05-27 PROCEDURE — 95992 CANALITH REPOSITIONING PROC: CPT

## 2025-05-27 NOTE — TELEPHONE ENCOUNTER
Results not downloaded yet, not read by EP. This should be done in a day or so. I LMOM for dru updating him that he will receive a call with results in a day or so (he knows Dr. Au is out of the office). I asked him to call me Friday if he has not heard from our office re his results.

## 2025-05-27 NOTE — FLOWSHEET NOTE
[facility] - Outpatient Rehabilitation and Therapy: [address], [address], [place] [address] office: [phone] fax: [phone]       Physical Therapy: TREATMENT/PROGRESS NOTE   Patient: Ezra Gonzalez (75 y.o. male)   Examination Date: 2025   :  1950 MRN: [id number]   Visit #: 3   Insurance Allowable Auth Needed    Auth not required []Yes    [x]No    Insurance: Payor: [facility] MEDICARE / Plan: [employer] DUAL COMPLETE / Product Type: *No Product type* /   Insurance ID: [id number] - (Medicare Managed)  Secondary Insurance (if applicable): MEDICAID [place]   Treatment Diagnosis:     ICD-10-CM    1. Dizziness  R42       2. Benign paroxysmal positional vertigo of left ear  H81.12       3. Poor balance  R26.89       4. Impaired mobility and ADLs  Z74.09     Z78.9          Medical Diagnosis:  Benign paroxysmal positional vertigo, unspecified laterality [H81.10]   Referring Physician: Fred Wise MD  PCP: Jazmin Noyola APRN - CNP       Plan of care signed (Y/N): sent for cosign  (received)    Date of Patient follow up with Physician:      Plan of Care Report: NO  POC update due: (10 visits /OR AUTH LIMITS, whichever is less)  2025                                             Medical History:  Comorbidities:  Anxiety  Depression  Other Cardiovascular Conditions: CAD, h/o 2 stents placed  Currently wearing a 30 day heart monitor                                         Precautions/ Contra-indications:           Latex allergy:  NO  Pacemaker:    NO  Contraindications for Manipulation: None  Date of Surgery: NA  Other:    Red Flags:  None    Suicide Screening:   The patient did not verbalize a primary behavioral concern, suicidal ideation, suicidal intent, or demonstrate suicidal behaviors.    Preferred Language for Healthcare:   [x] English       [] other:    SUBJECTIVE EXAMINATION     Patient stated complaint/comment: Pt reports over the last few months he has noticed he

## 2025-05-29 ENCOUNTER — APPOINTMENT (OUTPATIENT)
Dept: PHYSICAL THERAPY | Age: 75
End: 2025-05-29
Attending: OTOLARYNGOLOGY
Payer: MEDICARE

## 2025-05-30 NOTE — TELEPHONE ENCOUNTER
JEAN-CLAUDEOM asking him to call me back with the date he returned mailed it back. It was an MCOT that was placed on 4/29/25.

## 2025-05-30 NOTE — TELEPHONE ENCOUNTER
Ezra states he mailed the monitor back this past Monday or Tuesday. I informed him we should have the results back by the end of next week. He states he feels pretty good and that his dizziness has improved. He saw an ENT specialist who diagnosed him with vertigo due to crystals out of alignment; he attended PT and started to feel a lot better. He is looking forward to a motorcycle trip 6/15/25; I cautioned him to be careful as he should not be \"riding\" a motorcycle if he has the dizzy spells again. He said he is very careful.

## 2025-06-03 ENCOUNTER — TELEPHONE (OUTPATIENT)
Dept: CARDIOLOGY CLINIC | Age: 75
End: 2025-06-03

## 2025-06-03 LAB — ECHO BSA: 1.8 M2

## 2025-06-03 NOTE — TELEPHONE ENCOUNTER
Patient would like to see if his monitor results are in.     He would also like a call to go over his medications to make sure he is taking what he should.

## 2025-06-04 ENCOUNTER — CLINICAL DOCUMENTATION (OUTPATIENT)
Dept: CASE MANAGEMENT | Age: 75
End: 2025-06-04

## 2025-06-04 NOTE — PROGRESS NOTES
Patient meets lung screening criteria. Patient due for annual CT Lung Screening. Second reminder letter mailed. If ordered, Patient may call [phone] to schedule.     Lung Screen Criteria  Age 50-80  Current smoker or quit within the last 15 years  Has => 20 pack year history.    Future Appointments   Date Time Provider Department Center   6/5/2025  2:50 PM Sandra Palma, PT [facility]   9/12/2025 10:30 AM New Au MD [facility]   11/21/2025 10:30 AM Minesh Morton MD [facility]       Active Lung Screen order on chart: No

## 2025-06-04 NOTE — TELEPHONE ENCOUNTER
Spoke to patient and reviewed medications. Quick review of monitor results but did advise that ultimately it will be for WAK to review and advise. Pt is wanting to go on vacation on 6/15 and is awaiting results to make his decision to go. Advised that Wak will return on Monday and we will call next week with results, if he has not heard from us by Wednesday he can call to get results. Pt v/u and was agreeable to plan.

## 2025-06-05 ENCOUNTER — HOSPITAL ENCOUNTER (OUTPATIENT)
Dept: PHYSICAL THERAPY | Age: 75
Setting detail: THERAPIES SERIES
Discharge: HOME | End: 2025-06-05
Attending: OTOLARYNGOLOGY
Payer: MEDICARE

## 2025-06-05 PROCEDURE — 95992 CANALITH REPOSITIONING PROC: CPT

## 2025-06-05 NOTE — FLOWSHEET NOTE
Result   1.Normal Stance EO     2.Normal Stance EC     3.Staggered Stance EO     4.Stagger Stance EC     5.Normal Stance on Foam EO     6.Normal Stance on Foam EC     7.Fakuda Stepping test     If 6 and 7 are positive, there is a 95% sensitivity for UVL    Balance:  [] WNL      [] NT       [x] Dysfunction noted  Comment:     Gait Testing:     Level of Assistance needed:  Independent  Gait Deviations (firm surface/linoleum):    None  Assistive Device Used:  No AD    Positional Testing - 6/5/25    Nystagmus Direction Duration Vertigo Duration   Right Loaded Mp Hallpike Upbeating to the right x15 secs yes X10 secs   Left Loaded [place] Hallpike none  no    Right Sidelying Test - horz Ageotrophic - after Epley maneuver X60 secs yes X60 secs   Left Sidelying Test - horiz Not tested      Right Roll Test None before epley  no    Left Roll Test None before epley  no               Falls Risk Assessment (30 days):   Falls Risk assessed and no intervention required.  Time Up and Go (TUG):   Not Assessed     Exercises/Interventions     Exercises/Interventions:     Therapeutic Ex (83161)  Resistance Sets/time Reps Notes/Cues/Progressions                        NMR re-education (71205)              Vestibular Adaptation:  *smooth pursuits with single digit / word recognition  *saccades with single digit / word recognition  *Type I viewing ex  *VOR with single digit / word recognition  *VOR with stroops cards (word recognition / color recognition)  *VOR with shape recognition   *Targets with picture card  *[place] sways                      Vestibular Substitution:  *NBOS with EO  *NBOS with EC  *NBOS with head turns/nods  *Airex:     NBOS with EO     NBOS with EC     1 foot on mat     Tandem     NBOS with head turns/nods     Marching   *Ankle sways on foam  *body [place] sway on foam  *box step on mat  *quick 90* turns on mat  *Alt step tap   *rocker board              Vestibular Habituation:  *Seated quick head turns/nods  *Seated

## 2025-06-09 ENCOUNTER — TELEPHONE (OUTPATIENT)
Dept: CARDIOLOGY CLINIC | Age: 75
End: 2025-06-09

## 2025-06-09 NOTE — TELEPHONE ENCOUNTER
I have attempted to reach patient for this numerous times on numerous encounters that are duplicate. Patients phone does not seem to be working properly. Please attempt to get me on the phone if pt calls back again. Thank you!

## 2025-06-09 NOTE — TELEPHONE ENCOUNTER
Pt states he asked about his monitor results on 6/9 but was told they couldn't give those results but told him about the med increase and pt understood. Pt asked for Fina to call him on the monitor results. Pt states he is doing fine. Pt getting ready to go on a motorcycle trip and wants to make sure he will be fine to do that.

## 2025-06-10 ENCOUNTER — HOSPITAL ENCOUNTER (OUTPATIENT)
Dept: PHYSICAL THERAPY | Age: 75
Setting detail: THERAPIES SERIES
Discharge: HOME | End: 2025-06-10
Attending: OTOLARYNGOLOGY
Payer: MEDICARE

## 2025-06-10 PROCEDURE — 95992 CANALITH REPOSITIONING PROC: CPT

## 2025-06-10 PROCEDURE — 97112 NEUROMUSCULAR REEDUCATION: CPT

## 2025-06-10 NOTE — TELEPHONE ENCOUNTER
Pt called back, informed him of message from WALLACE. Pt verbalized understanding. No further action required.

## 2025-07-02 ENCOUNTER — CLINICAL DOCUMENTATION (OUTPATIENT)
Dept: CASE MANAGEMENT | Age: 75
End: 2025-07-02

## 2025-07-02 NOTE — PROGRESS NOTES
Patient meets lung screening criteria. Patient due for annual CT Lung Screening. Final reminder letter mailed. If ordered, Patient may call [phone] to schedule.     Lung Screen Criteria  Age 50-80  Current smoker or quit within the last 15 years  Has => 20 pack year history.    Future Appointments   Date Time Provider Department Center   9/12/2025 10:30 AM New Au MD [facility]   11/21/2025 10:30 AM Minesh Morton MD [facility]       Active Lung Screen order on chart: No

## 2025-08-12 ENCOUNTER — TELEPHONE (OUTPATIENT)
Dept: CARDIOLOGY CLINIC | Age: 75
End: 2025-08-12

## 2025-08-12 DIAGNOSIS — I73.9 PAD (PERIPHERAL ARTERY DISEASE): Primary | ICD-10-CM

## 2025-08-12 DIAGNOSIS — I73.9 CLAUDICATION: ICD-10-CM

## 2025-08-18 ENCOUNTER — HOSPITAL ENCOUNTER (OUTPATIENT)
Dept: PHYSICAL THERAPY | Age: 75
Setting detail: THERAPIES SERIES
Discharge: HOME | End: 2025-08-18
Attending: OTOLARYNGOLOGY
Payer: MEDICARE

## 2025-08-18 PROCEDURE — 95992 CANALITH REPOSITIONING PROC: CPT

## 2025-08-18 PROCEDURE — 97530 THERAPEUTIC ACTIVITIES: CPT

## 2025-08-20 ENCOUNTER — HOSPITAL ENCOUNTER (OUTPATIENT)
Dept: VASCULAR LAB | Age: 75
Discharge: HOME | End: 2025-08-22
Attending: INTERNAL MEDICINE
Payer: MEDICARE

## 2025-08-20 DIAGNOSIS — I73.9 CLAUDICATION: ICD-10-CM

## 2025-08-20 DIAGNOSIS — I73.9 PAD (PERIPHERAL ARTERY DISEASE): ICD-10-CM

## 2025-08-20 PROCEDURE — 93925 LOWER EXTREMITY STUDY: CPT

## 2025-08-21 ENCOUNTER — HOSPITAL ENCOUNTER (OUTPATIENT)
Dept: PHYSICAL THERAPY | Age: 75
Setting detail: THERAPIES SERIES
Discharge: HOME | End: 2025-08-21
Attending: OTOLARYNGOLOGY
Payer: MEDICARE

## 2025-08-21 LAB
VAS LEFT ABI: 0.97
VAS LEFT ARM BP: 136 MMHG
VAS LEFT ATA DIST PSV: 87.8 CM/S
VAS LEFT ATA MID PSV: 76.8 CM/S
VAS LEFT ATA PROX PSV: 122 CM/S
VAS LEFT CFA DIST PSV: 226 CM/S
VAS LEFT CFA PROX PSV: 178 CM/S
VAS LEFT DORSALIS PEDIS BP: 132 MMHG
VAS LEFT PERONEAL DIST PSV: -38.8 CM/S
VAS LEFT PERONEAL MID PSV: 91.4 CM/S
VAS LEFT PERONEAL PROX PSV: 193 CM/S
VAS LEFT PFA PROX PSV: 426 CM/S
VAS LEFT POP A DIST PSV: 125 CM/S
VAS LEFT POP A PROX PSV: 104 CM/S
VAS LEFT POP A PROX VEL RATIO: 1.74
VAS LEFT PTA BP: 98 MMHG
VAS LEFT PTA DIST PSV: 57.1 CM/S
VAS LEFT PTA MID PSV: 60.1 CM/S
VAS LEFT PTA PROX PSV: 58.8 CM/S
VAS LEFT SFA DIST PSV: 59.7 CM/S
VAS LEFT SFA DIST VEL RATIO: 1.17
VAS LEFT SFA MID PSV: 50.9 CM/S
VAS LEFT SFA MID VEL RATIO: 0.27
VAS LEFT SFA PROX PSV: 186 CM/S
VAS LEFT SFA PROX VEL RATIO: 0.82
VAS RIGHT ABI: 0.72
VAS RIGHT ARM BP: 132 MMHG
VAS RIGHT ATA DIST PSV: 84.5 CM/S
VAS RIGHT ATA MID PSV: 42.8 CM/S
VAS RIGHT ATA PROX PSV: 59.9 CM/S
VAS RIGHT CFA DIST PSV: 193 CM/S
VAS RIGHT CFA PROX PSV: 136 CM/S
VAS RIGHT DORSALIS PEDIS BP: 98 MMHG
VAS RIGHT PERONEAL DIST PSV: 43.5 CM/S
VAS RIGHT PERONEAL MID PSV: 49.4 CM/S
VAS RIGHT PERONEAL PROX PSV: 78.6 CM/S
VAS RIGHT PFA PROX PSV: 207 CM/S
VAS RIGHT POP A DIST PSV: 57.6 CM/S
VAS RIGHT POP A PROX PSV: 251 CM/S
VAS RIGHT POP A PROX VEL RATIO: 3.36
VAS RIGHT PTA BP: 90 MMHG
VAS RIGHT PTA DIST PSV: -57.6 CM/S
VAS RIGHT PTA MID PSV: 45.5 CM/S
VAS RIGHT PTA PROX PSV: 55 CM/S
VAS RIGHT SFA DIST PSV: 74.6 CM/S
VAS RIGHT SFA DIST VEL RATIO: 1.21
VAS RIGHT SFA MID PSV: 61.9 CM/S
VAS RIGHT SFA MID VEL RATIO: 0.6
VAS RIGHT SFA PROX PSV: 106 CM/S
VAS RIGHT SFA PROX VEL RATIO: 0.5

## 2025-08-21 PROCEDURE — 95992 CANALITH REPOSITIONING PROC: CPT

## 2025-08-25 ENCOUNTER — HOSPITAL ENCOUNTER (OUTPATIENT)
Dept: PHYSICAL THERAPY | Age: 75
Setting detail: THERAPIES SERIES
Discharge: HOME | End: 2025-08-25
Attending: OTOLARYNGOLOGY
Payer: MEDICARE

## 2025-08-25 ENCOUNTER — TELEPHONE (OUTPATIENT)
Dept: CARDIOLOGY CLINIC | Age: 75
End: 2025-08-25

## 2025-08-25 PROCEDURE — 95992 CANALITH REPOSITIONING PROC: CPT

## 2025-08-27 ENCOUNTER — TELEPHONE (OUTPATIENT)
Dept: ENT CLINIC | Age: 75
End: 2025-08-27

## 2025-08-27 DIAGNOSIS — R42 DIZZINESS: Primary | ICD-10-CM

## 2025-09-02 ENCOUNTER — HOSPITAL ENCOUNTER (OUTPATIENT)
Dept: CARDIAC REHAB | Age: 75
Setting detail: THERAPIES SERIES
Discharge: HOME | End: 2025-09-02
Payer: MEDICARE

## 2025-09-02 PROCEDURE — 93668 PERIPHERAL VASCULAR REHAB: CPT

## 2025-09-04 ENCOUNTER — HOSPITAL ENCOUNTER (OUTPATIENT)
Dept: CARDIAC REHAB | Age: 75
Setting detail: THERAPIES SERIES
Discharge: HOME | End: 2025-09-04
Payer: MEDICARE

## 2025-09-04 PROCEDURE — 93668 PERIPHERAL VASCULAR REHAB: CPT

## 2025-09-05 ENCOUNTER — HOSPITAL ENCOUNTER (OUTPATIENT)
Dept: CARDIAC REHAB | Age: 75
Setting detail: THERAPIES SERIES
Discharge: HOME | End: 2025-09-05
Payer: MEDICARE

## 2025-09-05 PROCEDURE — 93668 PERIPHERAL VASCULAR REHAB: CPT

## (undated) DEVICE — ARMADA 35 LL PTA CATHETER 6 MM X 200 MM X 135 CM / OVER-THE-WIRE: Brand: ARMADA

## (undated) DEVICE — PINNACLE INTRODUCER SHEATH: Brand: PINNACLE

## (undated) DEVICE — CATHETER ANGIOPLSTY L130CM BAL L250MM DIA6MM GWIRE 0.035IN

## (undated) DEVICE — 150CM STANDARD JWIRE

## (undated) DEVICE — CATHETER 5FR JR4 MEDTRONIC 100CM

## (undated) DEVICE — HI-TORQUE COMMAND ES GUIDE WIRE .014" 300 CM: Brand: HI-TORQUE COMMAND

## (undated) DEVICE — 6FR/7FR MYNXGRIP CLOSURE

## (undated) DEVICE — RADIFOCUS GLIDEWIRE ADVANTAGE GUIDEWIRE: Brand: GLIDEWIRE ADVANTAGE

## (undated) DEVICE — NAVICROSS SUPPORT CATHETER: Brand: NAVICROSS

## (undated) DEVICE — TUBE SET TRUVIC

## (undated) DEVICE — CATHETER PTCA L135CM BLLN L100MM DIA5MM INTRO SHTH 5FR

## (undated) DEVICE — CANISTER TRUVIC

## (undated) DEVICE — DESTINATION PERIPHERAL GUIDING SHEATH: Brand: DESTINATION

## (undated) DEVICE — CATHETER ANGIOPLSTY OTW 035 5 FRX135 CM 4X200 MM EVERCROSS

## (undated) DEVICE — PRODIGY HOTSHOT™ CONTROLLER: Brand: HOTSHOT™ CONTROLLER

## (undated) DEVICE — Device

## (undated) DEVICE — 6F PRODIGY™ CATHETER: Brand: PRODIGY™ CATHETER

## (undated) DEVICE — CATHETER ANGIOPLSTY L130CM BAL L220MM DIA6MM SHTH 5FR GWIRE